# Patient Record
Sex: FEMALE | Race: WHITE | NOT HISPANIC OR LATINO | URBAN - METROPOLITAN AREA
[De-identification: names, ages, dates, MRNs, and addresses within clinical notes are randomized per-mention and may not be internally consistent; named-entity substitution may affect disease eponyms.]

---

## 2019-03-27 PROBLEM — Z00.00 ENCOUNTER FOR PREVENTIVE HEALTH EXAMINATION: Status: ACTIVE | Noted: 2019-03-27

## 2019-04-01 ENCOUNTER — OUTPATIENT (OUTPATIENT)
Dept: OUTPATIENT SERVICES | Facility: HOSPITAL | Age: 54
LOS: 1 days | End: 2019-04-01
Payer: COMMERCIAL

## 2019-04-01 ENCOUNTER — APPOINTMENT (OUTPATIENT)
Dept: ULTRASOUND IMAGING | Facility: CLINIC | Age: 54
End: 2019-04-01

## 2019-04-01 ENCOUNTER — RESULT REVIEW (OUTPATIENT)
Age: 54
End: 2019-04-01

## 2019-04-01 DIAGNOSIS — Z00.8 ENCOUNTER FOR OTHER GENERAL EXAMINATION: ICD-10-CM

## 2019-04-01 PROCEDURE — 10005 FNA BX W/US GDN 1ST LES: CPT

## 2019-04-01 PROCEDURE — 88173 CYTOPATH EVAL FNA REPORT: CPT | Mod: 26

## 2019-04-01 PROCEDURE — 88172 CYTP DX EVAL FNA 1ST EA SITE: CPT

## 2019-04-01 PROCEDURE — 88173 CYTOPATH EVAL FNA REPORT: CPT

## 2019-08-06 ENCOUNTER — APPOINTMENT (OUTPATIENT)
Dept: MRI IMAGING | Facility: CLINIC | Age: 54
End: 2019-08-06
Payer: COMMERCIAL

## 2019-08-06 ENCOUNTER — OUTPATIENT (OUTPATIENT)
Dept: OUTPATIENT SERVICES | Facility: HOSPITAL | Age: 54
LOS: 1 days | End: 2019-08-06
Payer: COMMERCIAL

## 2019-08-06 DIAGNOSIS — M54.12 RADICULOPATHY, CERVICAL REGION: ICD-10-CM

## 2019-08-06 PROCEDURE — 72141 MRI NECK SPINE W/O DYE: CPT

## 2019-08-06 PROCEDURE — 72141 MRI NECK SPINE W/O DYE: CPT | Mod: 26

## 2019-08-13 ENCOUNTER — OUTPATIENT (OUTPATIENT)
Dept: OUTPATIENT SERVICES | Facility: HOSPITAL | Age: 54
LOS: 1 days | Discharge: HOME | End: 2019-08-13
Payer: COMMERCIAL

## 2019-08-13 DIAGNOSIS — M54.12 RADICULOPATHY, CERVICAL REGION: ICD-10-CM

## 2019-08-13 PROCEDURE — 62321 NJX INTERLAMINAR CRV/THRC: CPT

## 2019-08-13 PROCEDURE — 77003 FLUOROGUIDE FOR SPINE INJECT: CPT

## 2019-09-05 ENCOUNTER — OUTPATIENT (OUTPATIENT)
Dept: OUTPATIENT SERVICES | Facility: HOSPITAL | Age: 54
LOS: 1 days | End: 2019-09-05
Payer: COMMERCIAL

## 2019-09-05 DIAGNOSIS — M54.12 RADICULOPATHY, CERVICAL REGION: ICD-10-CM

## 2019-09-05 PROCEDURE — 77003 FLUOROGUIDE FOR SPINE INJECT: CPT

## 2019-09-05 PROCEDURE — 62321 NJX INTERLAMINAR CRV/THRC: CPT

## 2019-09-17 ENCOUNTER — OUTPATIENT (OUTPATIENT)
Dept: OUTPATIENT SERVICES | Facility: HOSPITAL | Age: 54
LOS: 1 days | End: 2019-09-17
Payer: COMMERCIAL

## 2019-09-17 DIAGNOSIS — M54.12 RADICULOPATHY, CERVICAL REGION: ICD-10-CM

## 2019-09-17 PROCEDURE — 62321 NJX INTERLAMINAR CRV/THRC: CPT

## 2019-09-17 PROCEDURE — 77003 FLUOROGUIDE FOR SPINE INJECT: CPT

## 2020-04-25 ENCOUNTER — MESSAGE (OUTPATIENT)
Age: 55
End: 2020-04-25

## 2020-05-02 LAB
SARS-COV-2 IGG SERPL IA-ACNC: 5.8 INDEX
SARS-COV-2 IGG SERPL QL IA: POSITIVE

## 2020-09-25 ENCOUNTER — OUTPATIENT (OUTPATIENT)
Dept: OUTPATIENT SERVICES | Facility: HOSPITAL | Age: 55
LOS: 1 days | End: 2020-09-25
Payer: COMMERCIAL

## 2020-09-25 DIAGNOSIS — H25.12 AGE-RELATED NUCLEAR CATARACT, LEFT EYE: ICD-10-CM

## 2020-09-25 PROCEDURE — 93010 ELECTROCARDIOGRAM REPORT: CPT

## 2020-09-25 PROCEDURE — 93005 ELECTROCARDIOGRAM TRACING: CPT

## 2020-10-12 ENCOUNTER — APPOINTMENT (OUTPATIENT)
Dept: INTERNAL MEDICINE | Facility: CLINIC | Age: 55
End: 2020-10-12
Payer: COMMERCIAL

## 2020-10-12 VITALS — SYSTOLIC BLOOD PRESSURE: 140 MMHG | DIASTOLIC BLOOD PRESSURE: 84 MMHG

## 2020-10-12 VITALS
TEMPERATURE: 98.2 F | BODY MASS INDEX: 22.15 KG/M2 | HEART RATE: 97 BPM | WEIGHT: 125 LBS | HEIGHT: 63 IN | OXYGEN SATURATION: 99 %

## 2020-10-12 DIAGNOSIS — Z23 ENCOUNTER FOR IMMUNIZATION: ICD-10-CM

## 2020-10-12 PROCEDURE — 99204 OFFICE O/P NEW MOD 45 MIN: CPT

## 2020-10-12 NOTE — PHYSICAL EXAM
[No Acute Distress] : no acute distress [Normal Sclera/Conjunctiva] : normal sclera/conjunctiva [Normal Outer Ear/Nose] : the outer ears and nose were normal in appearance [No JVD] : no jugular venous distention [No Lymphadenopathy] : no lymphadenopathy [No Respiratory Distress] : no respiratory distress  [No Accessory Muscle Use] : no accessory muscle use [Normal Rate] : normal rate  [Regular Rhythm] : with a regular rhythm [No Edema] : there was no peripheral edema [No Extremity Clubbing/Cyanosis] : no extremity clubbing/cyanosis [Soft] : abdomen soft [Non Tender] : non-tender

## 2021-01-01 ENCOUNTER — OUTPATIENT (OUTPATIENT)
Dept: OUTPATIENT SERVICES | Facility: HOSPITAL | Age: 56
LOS: 1 days | End: 2021-01-01
Payer: COMMERCIAL

## 2021-01-01 ENCOUNTER — LABORATORY RESULT (OUTPATIENT)
Age: 56
End: 2021-01-01

## 2021-01-01 ENCOUNTER — NON-APPOINTMENT (OUTPATIENT)
Age: 56
End: 2021-01-01

## 2021-01-01 ENCOUNTER — APPOINTMENT (OUTPATIENT)
Dept: HEPATOLOGY | Facility: CLINIC | Age: 56
End: 2021-01-01

## 2021-01-01 ENCOUNTER — APPOINTMENT (OUTPATIENT)
Dept: DISASTER EMERGENCY | Facility: CLINIC | Age: 56
End: 2021-01-01

## 2021-01-01 ENCOUNTER — APPOINTMENT (OUTPATIENT)
Dept: INTERNAL MEDICINE | Facility: CLINIC | Age: 56
End: 2021-01-01
Payer: COMMERCIAL

## 2021-01-01 ENCOUNTER — OUTPATIENT (OUTPATIENT)
Dept: OUTPATIENT SERVICES | Facility: HOSPITAL | Age: 56
LOS: 1 days | End: 2021-01-01

## 2021-01-01 ENCOUNTER — APPOINTMENT (OUTPATIENT)
Dept: INTERVENTIONAL RADIOLOGY/VASCULAR | Facility: CLINIC | Age: 56
End: 2021-01-01

## 2021-01-01 ENCOUNTER — APPOINTMENT (OUTPATIENT)
Dept: HEPATOLOGY | Facility: CLINIC | Age: 56
End: 2021-01-01
Payer: COMMERCIAL

## 2021-01-01 ENCOUNTER — APPOINTMENT (OUTPATIENT)
Dept: HEPATOLOGY | Facility: HOSPITAL | Age: 56
End: 2021-01-01

## 2021-01-01 ENCOUNTER — RESULT REVIEW (OUTPATIENT)
Age: 56
End: 2021-01-01

## 2021-01-01 ENCOUNTER — APPOINTMENT (OUTPATIENT)
Dept: ULTRASOUND IMAGING | Facility: HOSPITAL | Age: 56
End: 2021-01-01

## 2021-01-01 ENCOUNTER — INPATIENT (INPATIENT)
Facility: HOSPITAL | Age: 56
LOS: 2 days | DRG: 871 | End: 2021-11-30
Attending: STUDENT IN AN ORGANIZED HEALTH CARE EDUCATION/TRAINING PROGRAM | Admitting: STUDENT IN AN ORGANIZED HEALTH CARE EDUCATION/TRAINING PROGRAM
Payer: COMMERCIAL

## 2021-01-01 ENCOUNTER — OUTPATIENT (OUTPATIENT)
Dept: OUTPATIENT SERVICES | Facility: HOSPITAL | Age: 56
LOS: 1 days | Discharge: HOME | End: 2021-01-01
Payer: COMMERCIAL

## 2021-01-01 ENCOUNTER — EMERGENCY (EMERGENCY)
Facility: HOSPITAL | Age: 56
LOS: 1 days | Discharge: HOME | End: 2021-01-01
Attending: EMERGENCY MEDICINE | Admitting: EMERGENCY MEDICINE
Payer: COMMERCIAL

## 2021-01-01 ENCOUNTER — APPOINTMENT (OUTPATIENT)
Dept: ULTRASOUND IMAGING | Facility: CLINIC | Age: 56
End: 2021-01-01
Payer: COMMERCIAL

## 2021-01-01 VITALS
HEIGHT: 63 IN | TEMPERATURE: 98 F | SYSTOLIC BLOOD PRESSURE: 74 MMHG | OXYGEN SATURATION: 99 % | RESPIRATION RATE: 22 BRPM | DIASTOLIC BLOOD PRESSURE: 47 MMHG | WEIGHT: 130.07 LBS | HEART RATE: 105 BPM

## 2021-01-01 VITALS
RESPIRATION RATE: 19 BRPM | OXYGEN SATURATION: 97 % | DIASTOLIC BLOOD PRESSURE: 60 MMHG | HEART RATE: 82 BPM | SYSTOLIC BLOOD PRESSURE: 120 MMHG

## 2021-01-01 VITALS
HEART RATE: 95 BPM | OXYGEN SATURATION: 100 % | RESPIRATION RATE: 18 BRPM | HEIGHT: 63 IN | WEIGHT: 121.92 LBS | DIASTOLIC BLOOD PRESSURE: 74 MMHG | SYSTOLIC BLOOD PRESSURE: 100 MMHG | TEMPERATURE: 98 F

## 2021-01-01 VITALS
OXYGEN SATURATION: 96 % | HEART RATE: 75 BPM | WEIGHT: 120 LBS | DIASTOLIC BLOOD PRESSURE: 80 MMHG | BODY MASS INDEX: 21.26 KG/M2 | SYSTOLIC BLOOD PRESSURE: 116 MMHG | TEMPERATURE: 98 F

## 2021-01-01 VITALS
HEIGHT: 63 IN | DIASTOLIC BLOOD PRESSURE: 91 MMHG | RESPIRATION RATE: 16 BRPM | SYSTOLIC BLOOD PRESSURE: 142 MMHG | HEART RATE: 75 BPM | OXYGEN SATURATION: 96 % | WEIGHT: 124 LBS | BODY MASS INDEX: 21.97 KG/M2 | TEMPERATURE: 97.8 F

## 2021-01-01 VITALS
RESPIRATION RATE: 20 BRPM | HEIGHT: 63 IN | DIASTOLIC BLOOD PRESSURE: 75 MMHG | HEART RATE: 73 BPM | WEIGHT: 125 LBS | SYSTOLIC BLOOD PRESSURE: 119 MMHG | OXYGEN SATURATION: 99 %

## 2021-01-01 VITALS
DIASTOLIC BLOOD PRESSURE: 81 MMHG | RESPIRATION RATE: 16 BRPM | OXYGEN SATURATION: 97 % | TEMPERATURE: 98.7 F | WEIGHT: 134 LBS | BODY MASS INDEX: 23.74 KG/M2 | HEART RATE: 92 BPM | HEIGHT: 63 IN | SYSTOLIC BLOOD PRESSURE: 121 MMHG

## 2021-01-01 VITALS — SYSTOLIC BLOOD PRESSURE: 110 MMHG | DIASTOLIC BLOOD PRESSURE: 78 MMHG

## 2021-01-01 VITALS
HEART RATE: 107 BPM | OXYGEN SATURATION: 99 % | BODY MASS INDEX: 23.74 KG/M2 | WEIGHT: 134 LBS | HEIGHT: 63 IN | TEMPERATURE: 98 F

## 2021-01-01 VITALS
RESPIRATION RATE: 17 BRPM | TEMPERATURE: 98 F | OXYGEN SATURATION: 100 % | DIASTOLIC BLOOD PRESSURE: 70 MMHG | SYSTOLIC BLOOD PRESSURE: 104 MMHG

## 2021-01-01 VITALS
BODY MASS INDEX: 21.19 KG/M2 | HEIGHT: 63 IN | WEIGHT: 119.6 LBS | HEART RATE: 71 BPM | OXYGEN SATURATION: 98 % | DIASTOLIC BLOOD PRESSURE: 86 MMHG | RESPIRATION RATE: 16 BRPM | TEMPERATURE: 98.3 F | SYSTOLIC BLOOD PRESSURE: 124 MMHG

## 2021-01-01 VITALS
RESPIRATION RATE: 14 BRPM | WEIGHT: 136 LBS | HEIGHT: 63 IN | BODY MASS INDEX: 24.1 KG/M2 | TEMPERATURE: 98.1 F | HEART RATE: 93 BPM | OXYGEN SATURATION: 97 %

## 2021-01-01 VITALS — SYSTOLIC BLOOD PRESSURE: 144 MMHG | DIASTOLIC BLOOD PRESSURE: 84 MMHG

## 2021-01-01 VITALS
BODY MASS INDEX: 21.97 KG/M2 | OXYGEN SATURATION: 97 % | WEIGHT: 124 LBS | HEART RATE: 64 BPM | HEIGHT: 63 IN | TEMPERATURE: 97.7 F

## 2021-01-01 VITALS — RESPIRATION RATE: 9 BRPM

## 2021-01-01 DIAGNOSIS — K70.31 ALCOHOLIC CIRRHOSIS OF LIVER WITH ASCITES: ICD-10-CM

## 2021-01-01 DIAGNOSIS — E46 UNSPECIFIED PROTEIN-CALORIE MALNUTRITION: ICD-10-CM

## 2021-01-01 DIAGNOSIS — R74.8 ABNORMAL LEVELS OF OTHER SERUM ENZYMES: ICD-10-CM

## 2021-01-01 DIAGNOSIS — I85.00 ESOPHAGEAL VARICES W/OUT BLEEDING: ICD-10-CM

## 2021-01-01 DIAGNOSIS — Z87.19 PERSONAL HISTORY OF OTHER DISEASES OF THE DIGESTIVE SYSTEM: ICD-10-CM

## 2021-01-01 DIAGNOSIS — A41.9 SEPSIS, UNSPECIFIED ORGANISM: ICD-10-CM

## 2021-01-01 DIAGNOSIS — R06.89 OTHER ABNORMALITIES OF BREATHING: ICD-10-CM

## 2021-01-01 DIAGNOSIS — Z78.9 OTHER SPECIFIED HEALTH STATUS: ICD-10-CM

## 2021-01-01 DIAGNOSIS — D12.6 BENIGN NEOPLASM OF COLON, UNSPECIFIED: ICD-10-CM

## 2021-01-01 DIAGNOSIS — Z86.39 PERSONAL HISTORY OF OTHER ENDOCRINE, NUTRITIONAL AND METABOLIC DISEASE: ICD-10-CM

## 2021-01-01 DIAGNOSIS — R14.0 ABDOMINAL DISTENSION (GASEOUS): ICD-10-CM

## 2021-01-01 DIAGNOSIS — D64.9 ANEMIA, UNSPECIFIED: ICD-10-CM

## 2021-01-01 DIAGNOSIS — E87.2 ACIDOSIS: ICD-10-CM

## 2021-01-01 DIAGNOSIS — R65.10 SYSTEMIC INFLAMMATORY RESPONSE SYNDROME (SIRS) OF NON-INFECTIOUS ORIGIN WITHOUT ACUTE ORGAN DYSFUNCTION: ICD-10-CM

## 2021-01-01 DIAGNOSIS — E88.01 ALPHA-1-ANTITRYPSIN DEFICIENCY: ICD-10-CM

## 2021-01-01 DIAGNOSIS — N17.9 ACUTE KIDNEY FAILURE, UNSPECIFIED: ICD-10-CM

## 2021-01-01 DIAGNOSIS — I10 ESSENTIAL (PRIMARY) HYPERTENSION: ICD-10-CM

## 2021-01-01 DIAGNOSIS — K74.60 UNSPECIFIED CIRRHOSIS OF LIVER: ICD-10-CM

## 2021-01-01 DIAGNOSIS — K72.90 HEPATIC FAILURE, UNSPECIFIED WITHOUT COMA: ICD-10-CM

## 2021-01-01 DIAGNOSIS — K31.89 OTHER DISEASES OF STOMACH AND DUODENUM: ICD-10-CM

## 2021-01-01 DIAGNOSIS — Z29.9 ENCOUNTER FOR PROPHYLACTIC MEASURES, UNSPECIFIED: ICD-10-CM

## 2021-01-01 DIAGNOSIS — R19.4 CHANGE IN BOWEL HABIT: ICD-10-CM

## 2021-01-01 DIAGNOSIS — M62.50 MUSCLE WASTING AND ATROPHY, NOT ELSEWHERE CLASSIFIED, UNSPECIFIED SITE: ICD-10-CM

## 2021-01-01 DIAGNOSIS — Z98.890 OTHER SPECIFIED POSTPROCEDURAL STATES: Chronic | ICD-10-CM

## 2021-01-01 DIAGNOSIS — R60.0 LOCALIZED EDEMA: ICD-10-CM

## 2021-01-01 DIAGNOSIS — F10.10 ALCOHOL ABUSE, UNCOMPLICATED: ICD-10-CM

## 2021-01-01 LAB
A1AT PHENOTYP SERPL-IMP: NORMAL
A1AT SERPL-MCNC: 117 MG/DL
AFP-TM SERPL-MCNC: 5.2 NG/ML
ALBUMIN FLD-MCNC: <0.2 G/DL — SIGNIFICANT CHANGE UP
ALBUMIN SERPL ELPH-MCNC: 1.7 G/DL — LOW (ref 3.3–5)
ALBUMIN SERPL ELPH-MCNC: 1.9 G/DL — LOW (ref 3.3–5)
ALBUMIN SERPL ELPH-MCNC: 2 G/DL — LOW (ref 3.3–5)
ALBUMIN SERPL ELPH-MCNC: 3.1 G/DL — LOW (ref 3.3–5)
ALBUMIN SERPL ELPH-MCNC: 3.1 G/DL — LOW (ref 3.3–5)
ALBUMIN SERPL ELPH-MCNC: 3.3 G/DL
ALBUMIN SERPL ELPH-MCNC: 3.4 G/DL
ALP BLD-CCNC: 128 U/L
ALP BLD-CCNC: 136 U/L
ALP SERPL-CCNC: 122 U/L — HIGH (ref 40–120)
ALP SERPL-CCNC: 141 U/L — HIGH (ref 40–120)
ALP SERPL-CCNC: 156 U/L — HIGH (ref 40–120)
ALP SERPL-CCNC: 76 U/L — SIGNIFICANT CHANGE UP (ref 40–120)
ALP SERPL-CCNC: 92 U/L — SIGNIFICANT CHANGE UP (ref 40–120)
ALPHA-1-FETOPROTEIN-L3: SIGNIFICANT CHANGE UP % (ref 0–9.9)
ALPHA-1-FETOPROTEIN: 4 NG/ML — SIGNIFICANT CHANGE UP (ref 0–8)
ALT FLD-CCNC: 34 U/L — SIGNIFICANT CHANGE UP (ref 12–78)
ALT FLD-CCNC: 37 U/L — SIGNIFICANT CHANGE UP (ref 12–78)
ALT FLD-CCNC: 40 U/L — SIGNIFICANT CHANGE UP (ref 12–78)
ALT FLD-CCNC: 43 U/L — SIGNIFICANT CHANGE UP (ref 12–78)
ALT FLD-CCNC: 45 U/L — SIGNIFICANT CHANGE UP (ref 12–78)
ALT SERPL-CCNC: 26 U/L
ALT SERPL-CCNC: 27 U/L
AMMONIA BLD-MCNC: 107 UMOL/L — HIGH (ref 11–32)
AMMONIA BLD-MCNC: 118 UMOL/L — HIGH (ref 11–32)
AMMONIA BLD-MCNC: 175 UMOL/L — HIGH (ref 11–32)
AMMONIA BLD-MCNC: 42 UMOL/L — HIGH (ref 11–32)
AMMONIA BLD-MCNC: 84 UMOL/L — HIGH (ref 11–32)
AMYLASE/CREAT SERPL: 57 U/L
ANA PAT FLD IF-IMP: ABNORMAL
ANA PATTERN: ABNORMAL
ANA SER IF-ACNC: ABNORMAL
ANA TITER: ABNORMAL
ANION GAP SERPL CALC-SCNC: 10 MMOL/L — SIGNIFICANT CHANGE UP (ref 5–17)
ANION GAP SERPL CALC-SCNC: 13 MMOL/L
ANION GAP SERPL CALC-SCNC: 14 MMOL/L
ANION GAP SERPL CALC-SCNC: 14 MMOL/L — SIGNIFICANT CHANGE UP (ref 5–17)
ANION GAP SERPL CALC-SCNC: 15 MMOL/L — SIGNIFICANT CHANGE UP (ref 5–17)
ANION GAP SERPL CALC-SCNC: 15 MMOL/L — SIGNIFICANT CHANGE UP (ref 5–17)
ANION GAP SERPL CALC-SCNC: 17 MMOL/L — SIGNIFICANT CHANGE UP (ref 5–17)
APPEARANCE UR: ABNORMAL
APTT BLD: 36.6 SEC — HIGH (ref 27.5–35.5)
APTT BLD: 38.9 SEC — HIGH (ref 27.5–35.5)
APTT BLD: 72.6 SEC — HIGH (ref 27.5–35.5)
AST SERPL-CCNC: 102 U/L — HIGH (ref 15–37)
AST SERPL-CCNC: 108 U/L
AST SERPL-CCNC: 83 U/L — HIGH (ref 15–37)
AST SERPL-CCNC: 84 U/L — HIGH (ref 15–37)
AST SERPL-CCNC: 86 U/L — HIGH (ref 15–37)
AST SERPL-CCNC: 93 U/L — HIGH (ref 15–37)
AST SERPL-CCNC: 95 U/L
B PERT IGG+IGM PNL SER: CLEAR — SIGNIFICANT CHANGE UP
BACTERIA # UR AUTO: ABNORMAL
BASE EXCESS BLDA CALC-SCNC: -14.1 MMOL/L — LOW (ref -2–3)
BASE EXCESS BLDA CALC-SCNC: -15.5 MMOL/L — LOW (ref -2–3)
BASE EXCESS BLDA CALC-SCNC: -17.1 MMOL/L — LOW (ref -2–3)
BASOPHILS # BLD AUTO: 0.02 K/UL — SIGNIFICANT CHANGE UP (ref 0–0.2)
BASOPHILS # BLD AUTO: 0.02 K/UL — SIGNIFICANT CHANGE UP (ref 0–0.2)
BASOPHILS # BLD AUTO: 0.03 K/UL — SIGNIFICANT CHANGE UP (ref 0–0.2)
BASOPHILS # BLD AUTO: 0.04 K/UL — SIGNIFICANT CHANGE UP (ref 0–0.2)
BASOPHILS # BLD AUTO: 0.06 K/UL
BASOPHILS # BLD AUTO: 0.09 K/UL
BASOPHILS NFR BLD AUTO: 0.2 % — SIGNIFICANT CHANGE UP (ref 0–2)
BASOPHILS NFR BLD AUTO: 0.2 % — SIGNIFICANT CHANGE UP (ref 0–2)
BASOPHILS NFR BLD AUTO: 0.3 % — SIGNIFICANT CHANGE UP (ref 0–2)
BASOPHILS NFR BLD AUTO: 0.8 % — SIGNIFICANT CHANGE UP (ref 0–2)
BASOPHILS NFR BLD AUTO: 1.1 %
BASOPHILS NFR BLD AUTO: 1.6 %
BILIRUB DIRECT SERPL-MCNC: 0.7 MG/DL — HIGH (ref 0–0.3)
BILIRUB SERPL-MCNC: 1.3 MG/DL — HIGH (ref 0.2–1.2)
BILIRUB SERPL-MCNC: 1.4 MG/DL — HIGH (ref 0.2–1.2)
BILIRUB SERPL-MCNC: 1.5 MG/DL — HIGH (ref 0.2–1.2)
BILIRUB SERPL-MCNC: 1.7 MG/DL — HIGH (ref 0.2–1.2)
BILIRUB SERPL-MCNC: 1.8 MG/DL — HIGH (ref 0.2–1.2)
BILIRUB SERPL-MCNC: 2.3 MG/DL
BILIRUB SERPL-MCNC: 2.4 MG/DL
BILIRUB UR-MCNC: NEGATIVE — SIGNIFICANT CHANGE UP
BLOOD GAS COMMENTS ARTERIAL: SIGNIFICANT CHANGE UP
BUN SERPL-MCNC: 4 MG/DL
BUN SERPL-MCNC: 4 MG/DL
BUN SERPL-MCNC: 52 MG/DL — HIGH (ref 7–23)
BUN SERPL-MCNC: 83 MG/DL — HIGH (ref 7–23)
BUN SERPL-MCNC: 84 MG/DL — HIGH (ref 7–23)
BUN SERPL-MCNC: 88 MG/DL — HIGH (ref 7–23)
BUN SERPL-MCNC: 90 MG/DL — HIGH (ref 7–23)
CALCIUM SERPL-MCNC: 8.9 MG/DL
CALCIUM SERPL-MCNC: 8.9 MG/DL — SIGNIFICANT CHANGE UP (ref 8.5–10.1)
CALCIUM SERPL-MCNC: 9 MG/DL — SIGNIFICANT CHANGE UP (ref 8.5–10.1)
CALCIUM SERPL-MCNC: 9 MG/DL — SIGNIFICANT CHANGE UP (ref 8.5–10.1)
CALCIUM SERPL-MCNC: 9.1 MG/DL
CALCIUM SERPL-MCNC: 9.1 MG/DL — SIGNIFICANT CHANGE UP (ref 8.5–10.1)
CALCIUM SERPL-MCNC: 9.7 MG/DL — SIGNIFICANT CHANGE UP (ref 8.5–10.1)
CERULOPLASMIN SERPL-MCNC: 25 MG/DL
CHLORIDE SERPL-SCNC: 100 MMOL/L
CHLORIDE SERPL-SCNC: 101 MMOL/L — SIGNIFICANT CHANGE UP (ref 96–108)
CHLORIDE SERPL-SCNC: 102 MMOL/L — SIGNIFICANT CHANGE UP (ref 96–108)
CHLORIDE SERPL-SCNC: 103 MMOL/L — SIGNIFICANT CHANGE UP (ref 96–108)
CHLORIDE SERPL-SCNC: 104 MMOL/L — SIGNIFICANT CHANGE UP (ref 96–108)
CHLORIDE SERPL-SCNC: 107 MMOL/L — SIGNIFICANT CHANGE UP (ref 96–108)
CHLORIDE SERPL-SCNC: 99 MMOL/L
CHOLEST SERPL-MCNC: 143 MG/DL
CK MB BLD-MCNC: 11.9 % — HIGH (ref 0–3.5)
CK MB CFR SERPL CALC: 10 NG/ML — HIGH (ref 0–3.6)
CK SERPL-CCNC: 84 U/L — SIGNIFICANT CHANGE UP (ref 26–192)
CO2 SERPL-SCNC: 13 MMOL/L — LOW (ref 22–31)
CO2 SERPL-SCNC: 14 MMOL/L — LOW (ref 22–31)
CO2 SERPL-SCNC: 20 MMOL/L — LOW (ref 22–31)
CO2 SERPL-SCNC: 24 MMOL/L
CO2 SERPL-SCNC: 24 MMOL/L
COLOR FLD: YELLOW — SIGNIFICANT CHANGE UP
COLOR SPEC: YELLOW — SIGNIFICANT CHANGE UP
COMMENT - URINE: SIGNIFICANT CHANGE UP
CORTIS AM PEAK SERPL-MCNC: 27.1 UG/DL — HIGH (ref 6–18.4)
COVID-19 NUCLEOCAPSID GAM AB INTERP: POSITIVE
COVID-19 NUCLEOCAPSID TOTAL GAM ANTIBODY RESULT: 53.6 INDEX — HIGH
COVID-19 SPIKE DOMAIN AB INTERP: POSITIVE
COVID-19 SPIKE DOMAIN ANTIBODY RESULT: >250 U/ML — HIGH
CREAT ?TM UR-MCNC: 87 MG/DL — SIGNIFICANT CHANGE UP
CREAT SERPL-MCNC: 0.48 MG/DL
CREAT SERPL-MCNC: 0.51 MG/DL
CREAT SERPL-MCNC: 1.5 MG/DL — HIGH (ref 0.5–1.3)
CREAT SERPL-MCNC: 4 MG/DL — HIGH (ref 0.5–1.3)
CREAT SERPL-MCNC: 4 MG/DL — HIGH (ref 0.5–1.3)
CREAT SERPL-MCNC: 4.1 MG/DL — HIGH (ref 0.5–1.3)
CREAT SERPL-MCNC: 4.3 MG/DL — HIGH (ref 0.5–1.3)
CULTURE RESULTS: NO GROWTH — SIGNIFICANT CHANGE UP
DEPRECATED KAPPA LC FREE/LAMBDA SER: 0.77 RATIO
DIFF PNL FLD: ABNORMAL
EOSINOPHIL # BLD AUTO: 0 K/UL — SIGNIFICANT CHANGE UP (ref 0–0.5)
EOSINOPHIL # BLD AUTO: 0.01 K/UL — SIGNIFICANT CHANGE UP (ref 0–0.5)
EOSINOPHIL # BLD AUTO: 0.02 K/UL — SIGNIFICANT CHANGE UP (ref 0–0.5)
EOSINOPHIL # BLD AUTO: 0.03 K/UL
EOSINOPHIL # BLD AUTO: 0.04 K/UL — SIGNIFICANT CHANGE UP (ref 0–0.5)
EOSINOPHIL # BLD AUTO: 0.07 K/UL
EOSINOPHIL NFR BLD AUTO: 0 % — SIGNIFICANT CHANGE UP (ref 0–6)
EOSINOPHIL NFR BLD AUTO: 0.1 % — SIGNIFICANT CHANGE UP (ref 0–6)
EOSINOPHIL NFR BLD AUTO: 0.2 % — SIGNIFICANT CHANGE UP (ref 0–6)
EOSINOPHIL NFR BLD AUTO: 0.5 %
EOSINOPHIL NFR BLD AUTO: 0.8 % — SIGNIFICANT CHANGE UP (ref 0–6)
EOSINOPHIL NFR BLD AUTO: 1.3 %
EPI CELLS # UR: SIGNIFICANT CHANGE UP
ETHANOL BLD-MCNC: <10 MG/DL
ETHANOL SERPL-MCNC: <10 MG/DL — SIGNIFICANT CHANGE UP (ref 0–10)
ETHANOL SERPL-MCNC: <10 MG/DL — SIGNIFICANT CHANGE UP (ref 0–10)
FERRITIN SERPL-MCNC: 116 NG/ML
FLUID INTAKE SUBSTANCE CLASS: SIGNIFICANT CHANGE UP
FLUID SEGMENTED GRANULOCYTES: 19 % — SIGNIFICANT CHANGE UP
FOLATE SERPL-MCNC: 9.6 NG/ML
GAS PNL BLDA: SIGNIFICANT CHANGE UP
GAS PNL BLDA: SIGNIFICANT CHANGE UP
GGT SERPL-CCNC: 237 U/L
GLUCOSE FLD-MCNC: 93 MG/DL — SIGNIFICANT CHANGE UP
GLUCOSE SERPL-MCNC: 101 MG/DL — HIGH (ref 70–99)
GLUCOSE SERPL-MCNC: 102 MG/DL — HIGH (ref 70–99)
GLUCOSE SERPL-MCNC: 105 MG/DL — HIGH (ref 70–99)
GLUCOSE SERPL-MCNC: 133 MG/DL
GLUCOSE SERPL-MCNC: 77 MG/DL — SIGNIFICANT CHANGE UP (ref 70–99)
GLUCOSE SERPL-MCNC: 93 MG/DL
GLUCOSE SERPL-MCNC: 93 MG/DL — SIGNIFICANT CHANGE UP (ref 70–99)
GLUCOSE UR QL: NEGATIVE — SIGNIFICANT CHANGE UP
GRAM STN FLD: SIGNIFICANT CHANGE UP
HAV IGM SER QL: NONREACTIVE
HBV CORE IGG+IGM SER QL: NONREACTIVE
HBV SURFACE AB SER QL: REACTIVE
HBV SURFACE AB SERPL IA-ACNC: 87.8 MIU/ML
HBV SURFACE AG SER QL: NONREACTIVE
HCO3 BLDA-SCNC: 11 MMOL/L — LOW (ref 21–28)
HCO3 BLDA-SCNC: 11 MMOL/L — LOW (ref 21–28)
HCO3 BLDA-SCNC: 12 MMOL/L — LOW (ref 21–28)
HCT VFR BLD CALC: 22.7 % — LOW (ref 34.5–45)
HCT VFR BLD CALC: 24.3 % — LOW (ref 34.5–45)
HCT VFR BLD CALC: 28.4 % — LOW (ref 34.5–45)
HCT VFR BLD CALC: 28.8 % — LOW (ref 34.5–45)
HCT VFR BLD CALC: 29.7 % — LOW (ref 34.5–45)
HCT VFR BLD CALC: 38.9 %
HCT VFR BLD CALC: 43.4 %
HCV AB S/CO SERPL IA: 0.18 S/CO — SIGNIFICANT CHANGE UP (ref 0–0.99)
HCV AB SER QL: NONREACTIVE
HCV AB SERPL-IMP: SIGNIFICANT CHANGE UP
HCV S/CO RATIO: 0.13 S/CO
HDLC SERPL-MCNC: 19 MG/DL
HEPATITIS A IGG ANTIBODY: NONREACTIVE
HGB BLD-MCNC: 10.3 G/DL — LOW (ref 11.5–15.5)
HGB BLD-MCNC: 10.7 G/DL — LOW (ref 11.5–15.5)
HGB BLD-MCNC: 13 G/DL
HGB BLD-MCNC: 13.5 G/DL
HGB BLD-MCNC: 8 G/DL — LOW (ref 11.5–15.5)
HGB BLD-MCNC: 8.5 G/DL — LOW (ref 11.5–15.5)
HGB BLD-MCNC: 9.9 G/DL — LOW (ref 11.5–15.5)
HOROWITZ INDEX BLDA+IHG-RTO: 100 — SIGNIFICANT CHANGE UP
HOROWITZ INDEX BLDA+IHG-RTO: 21 — SIGNIFICANT CHANGE UP
HOROWITZ INDEX BLDA+IHG-RTO: 30 — SIGNIFICANT CHANGE UP
HYALINE CASTS # UR AUTO: ABNORMAL
IGA SER QL IEP: 811 MG/DL
IGG SER QL IEP: 1331 MG/DL
IGM SER QL IEP: 230 MG/DL
IMM GRANULOCYTES NFR BLD AUTO: 0.2 %
IMM GRANULOCYTES NFR BLD AUTO: 0.2 %
IMM GRANULOCYTES NFR BLD AUTO: 0.6 % — SIGNIFICANT CHANGE UP (ref 0–1.5)
IMM GRANULOCYTES NFR BLD AUTO: 0.6 % — SIGNIFICANT CHANGE UP (ref 0–1.5)
IMM GRANULOCYTES NFR BLD AUTO: 0.7 % — SIGNIFICANT CHANGE UP (ref 0–1.5)
IMM GRANULOCYTES NFR BLD AUTO: 0.7 % — SIGNIFICANT CHANGE UP (ref 0–1.5)
INR BLD: 1.47 RATIO — HIGH (ref 0.88–1.16)
INR BLD: 1.54 RATIO — HIGH (ref 0.88–1.16)
INR BLD: 2.06 RATIO — HIGH (ref 0.88–1.16)
INR BLD: 2.55 RATIO — HIGH (ref 0.88–1.16)
IRON SATN MFR SERPL: 43 %
IRON SERPL-MCNC: 86 UG/DL
KAPPA LC CSF-MCNC: 3.33 MG/DL
KAPPA LC SERPL-MCNC: 2.55 MG/DL
KETONES UR-MCNC: ABNORMAL
LACTATE SERPL-SCNC: 2.2 MMOL/L — HIGH (ref 0.7–2)
LACTATE SERPL-SCNC: 2.3 MMOL/L — HIGH (ref 0.7–2)
LACTATE SERPL-SCNC: 2.5 MMOL/L — HIGH (ref 0.7–2)
LACTATE SERPL-SCNC: 3 MMOL/L — HIGH (ref 0.7–2)
LDH SERPL L TO P-CCNC: 24 U/L — SIGNIFICANT CHANGE UP
LDLC SERPL CALC-MCNC: 99 MG/DL
LEUKOCYTE ESTERASE UR-ACNC: ABNORMAL
LIDOCAIN IGE QN: 318 U/L — SIGNIFICANT CHANGE UP (ref 73–393)
LYMPHOCYTES # BLD AUTO: 0.74 K/UL — LOW (ref 1–3.3)
LYMPHOCYTES # BLD AUTO: 0.88 K/UL — LOW (ref 1–3.3)
LYMPHOCYTES # BLD AUTO: 1.03 K/UL
LYMPHOCYTES # BLD AUTO: 1.14 K/UL — SIGNIFICANT CHANGE UP (ref 1–3.3)
LYMPHOCYTES # BLD AUTO: 1.2 K/UL
LYMPHOCYTES # BLD AUTO: 1.3 K/UL — SIGNIFICANT CHANGE UP (ref 1–3.3)
LYMPHOCYTES # BLD AUTO: 12.3 % — LOW (ref 13–44)
LYMPHOCYTES # BLD AUTO: 14 % — SIGNIFICANT CHANGE UP (ref 13–44)
LYMPHOCYTES # BLD AUTO: 14.2 % — SIGNIFICANT CHANGE UP (ref 13–44)
LYMPHOCYTES # BLD AUTO: 7.4 % — LOW (ref 13–44)
LYMPHOCYTES # FLD: 7 % — SIGNIFICANT CHANGE UP
LYMPHOCYTES NFR BLD AUTO: 18.8 %
LYMPHOCYTES NFR BLD AUTO: 21.8 %
MAGNESIUM SERPL-MCNC: 1.6 MG/DL
MAGNESIUM SERPL-MCNC: 2.3 MG/DL — SIGNIFICANT CHANGE UP (ref 1.6–2.6)
MAGNESIUM SERPL-MCNC: 2.4 MG/DL — SIGNIFICANT CHANGE UP (ref 1.6–2.6)
MAGNESIUM SERPL-MCNC: 2.5 MG/DL — SIGNIFICANT CHANGE UP (ref 1.6–2.6)
MAGNESIUM SERPL-MCNC: 2.7 MG/DL — HIGH (ref 1.6–2.6)
MAN DIFF?: NORMAL
MAN DIFF?: NORMAL
MCHC RBC-ENTMCNC: 31 PG — SIGNIFICANT CHANGE UP (ref 27–34)
MCHC RBC-ENTMCNC: 31.1 GM/DL
MCHC RBC-ENTMCNC: 31.1 PG — SIGNIFICANT CHANGE UP (ref 27–34)
MCHC RBC-ENTMCNC: 31.3 PG
MCHC RBC-ENTMCNC: 31.4 PG — SIGNIFICANT CHANGE UP (ref 27–34)
MCHC RBC-ENTMCNC: 31.4 PG — SIGNIFICANT CHANGE UP (ref 27–34)
MCHC RBC-ENTMCNC: 31.8 PG
MCHC RBC-ENTMCNC: 31.8 PG — SIGNIFICANT CHANGE UP (ref 27–34)
MCHC RBC-ENTMCNC: 33.4 GM/DL
MCHC RBC-ENTMCNC: 34.9 GM/DL — SIGNIFICANT CHANGE UP (ref 32–36)
MCHC RBC-ENTMCNC: 35 GM/DL — SIGNIFICANT CHANGE UP (ref 32–36)
MCHC RBC-ENTMCNC: 35.2 GM/DL — SIGNIFICANT CHANGE UP (ref 32–36)
MCHC RBC-ENTMCNC: 35.8 GM/DL — SIGNIFICANT CHANGE UP (ref 32–36)
MCHC RBC-ENTMCNC: 36 GM/DL — SIGNIFICANT CHANGE UP (ref 32–36)
MCV RBC AUTO: 100.7 FL
MCV RBC AUTO: 87.8 FL — SIGNIFICANT CHANGE UP (ref 80–100)
MCV RBC AUTO: 88.3 FL — SIGNIFICANT CHANGE UP (ref 80–100)
MCV RBC AUTO: 88.4 FL — SIGNIFICANT CHANGE UP (ref 80–100)
MCV RBC AUTO: 89 FL — SIGNIFICANT CHANGE UP (ref 80–100)
MCV RBC AUTO: 89.7 FL — SIGNIFICANT CHANGE UP (ref 80–100)
MCV RBC AUTO: 95.1 FL
MELD SCORE WITH DIALYSIS: 32 POINTS — SIGNIFICANT CHANGE UP
MELD SCORE WITHOUT DIALYSIS: 32 POINTS — SIGNIFICANT CHANGE UP
MESOTHL CELL # FLD: 3 % — SIGNIFICANT CHANGE UP
MITOCHONDRIA AB SER IF-ACNC: NORMAL
MONOCYTES # BLD AUTO: 0.69 K/UL — SIGNIFICANT CHANGE UP (ref 0–0.9)
MONOCYTES # BLD AUTO: 0.74 K/UL — SIGNIFICANT CHANGE UP (ref 0–0.9)
MONOCYTES # BLD AUTO: 0.77 K/UL — SIGNIFICANT CHANGE UP (ref 0–0.9)
MONOCYTES # BLD AUTO: 1.04 K/UL — HIGH (ref 0–0.9)
MONOCYTES # BLD AUTO: 1.06 K/UL
MONOCYTES # BLD AUTO: 1.07 K/UL
MONOCYTES NFR BLD AUTO: 13.2 % — SIGNIFICANT CHANGE UP (ref 2–14)
MONOCYTES NFR BLD AUTO: 19.3 %
MONOCYTES NFR BLD AUTO: 19.5 %
MONOCYTES NFR BLD AUTO: 7 % — SIGNIFICANT CHANGE UP (ref 2–14)
MONOCYTES NFR BLD AUTO: 8.7 % — SIGNIFICANT CHANGE UP (ref 2–14)
MONOCYTES NFR BLD AUTO: 9.4 % — SIGNIFICANT CHANGE UP (ref 2–14)
MONOS+MACROS # FLD: 71 % — SIGNIFICANT CHANGE UP
NEUTROPHILS # BLD AUTO: 3.06 K/UL
NEUTROPHILS # BLD AUTO: 3.3 K/UL
NEUTROPHILS # BLD AUTO: 3.68 K/UL — SIGNIFICANT CHANGE UP (ref 1.8–7.4)
NEUTROPHILS # BLD AUTO: 6.16 K/UL — SIGNIFICANT CHANGE UP (ref 1.8–7.4)
NEUTROPHILS # BLD AUTO: 8.4 K/UL — HIGH (ref 1.8–7.4)
NEUTROPHILS # BLD AUTO: 9.93 K/UL — HIGH (ref 1.8–7.4)
NEUTROPHILS NFR BLD AUTO: 55.6 %
NEUTROPHILS NFR BLD AUTO: 60.1 %
NEUTROPHILS NFR BLD AUTO: 70.4 % — SIGNIFICANT CHANGE UP (ref 43–77)
NEUTROPHILS NFR BLD AUTO: 75.5 % — SIGNIFICANT CHANGE UP (ref 43–77)
NEUTROPHILS NFR BLD AUTO: 79.7 % — HIGH (ref 43–77)
NEUTROPHILS NFR BLD AUTO: 83 % — HIGH (ref 43–77)
NIGHT BLUE STAIN TISS: SIGNIFICANT CHANGE UP
NITRITE UR-MCNC: NEGATIVE — SIGNIFICANT CHANGE UP
NONHDLC SERPL-MCNC: 124 MG/DL
NRBC # BLD: 0 /100 WBCS — SIGNIFICANT CHANGE UP (ref 0–0)
OSMOLALITY UR: 314 MOSM/KG — SIGNIFICANT CHANGE UP (ref 50–1200)
PCO2 BLDA: 20 MMHG — LOW (ref 32–35)
PCO2 BLDA: 26 MMHG — LOW (ref 32–35)
PCO2 BLDA: 31 MMHG — LOW (ref 32–35)
PH BLDA: 7.21 — LOW (ref 7.35–7.45)
PH BLDA: 7.22 — LOW (ref 7.35–7.45)
PH BLDA: 7.36 — SIGNIFICANT CHANGE UP (ref 7.35–7.45)
PH FLD: 7.4 — SIGNIFICANT CHANGE UP
PH UR: 5 — SIGNIFICANT CHANGE UP (ref 5–8)
PHOSPHATE SERPL-MCNC: 7.8 MG/DL — HIGH (ref 2.5–4.5)
PHOSPHATE SERPL-MCNC: 7.9 MG/DL — HIGH (ref 2.5–4.5)
PHOSPHATE SERPL-MCNC: 8 MG/DL — HIGH (ref 2.5–4.5)
PHOSPHATIDYETHANOL (PETH), WHOLE BLOOD: 974 NG/ML
PLATELET # BLD AUTO: 140 K/UL — LOW (ref 150–400)
PLATELET # BLD AUTO: 153 K/UL — SIGNIFICANT CHANGE UP (ref 150–400)
PLATELET # BLD AUTO: 155 K/UL
PLATELET # BLD AUTO: 170 K/UL — SIGNIFICANT CHANGE UP (ref 150–400)
PLATELET # BLD AUTO: 183 K/UL
PLATELET # BLD AUTO: 183 K/UL — SIGNIFICANT CHANGE UP (ref 150–400)
PLATELET # BLD AUTO: 190 K/UL — SIGNIFICANT CHANGE UP (ref 150–400)
PO2 BLDA: 107 MMHG — SIGNIFICANT CHANGE UP (ref 83–108)
PO2 BLDA: 114 MMHG — HIGH (ref 83–108)
PO2 BLDA: 99 MMHG — SIGNIFICANT CHANGE UP (ref 83–108)
POTASSIUM SERPL-MCNC: 3 MMOL/L — LOW (ref 3.5–5.3)
POTASSIUM SERPL-MCNC: 4.4 MMOL/L — SIGNIFICANT CHANGE UP (ref 3.5–5.3)
POTASSIUM SERPL-MCNC: 4.4 MMOL/L — SIGNIFICANT CHANGE UP (ref 3.5–5.3)
POTASSIUM SERPL-MCNC: 4.6 MMOL/L — SIGNIFICANT CHANGE UP (ref 3.5–5.3)
POTASSIUM SERPL-MCNC: 4.6 MMOL/L — SIGNIFICANT CHANGE UP (ref 3.5–5.3)
POTASSIUM SERPL-SCNC: 3 MMOL/L
POTASSIUM SERPL-SCNC: 3 MMOL/L — LOW (ref 3.5–5.3)
POTASSIUM SERPL-SCNC: 4 MMOL/L
POTASSIUM SERPL-SCNC: 4.4 MMOL/L — SIGNIFICANT CHANGE UP (ref 3.5–5.3)
POTASSIUM SERPL-SCNC: 4.4 MMOL/L — SIGNIFICANT CHANGE UP (ref 3.5–5.3)
POTASSIUM SERPL-SCNC: 4.6 MMOL/L — SIGNIFICANT CHANGE UP (ref 3.5–5.3)
POTASSIUM SERPL-SCNC: 4.6 MMOL/L — SIGNIFICANT CHANGE UP (ref 3.5–5.3)
POTASSIUM UR-SCNC: 43.9 MMOL/L — SIGNIFICANT CHANGE UP
PROCALCITONIN SERPL-MCNC: 0.17 NG/ML — HIGH (ref 0–0.04)
PROCALCITONIN SERPL-MCNC: 0.27 NG/ML — HIGH (ref 0–0.04)
PROT ?TM UR-MCNC: 40 MG/DL — HIGH (ref 0–12)
PROT ?TM UR-MCNC: 41 MG/DL — HIGH (ref 0–12)
PROT FLD-MCNC: <1 G/DL — SIGNIFICANT CHANGE UP
PROT SERPL-MCNC: 5.7 G/DL — LOW (ref 6–8.3)
PROT SERPL-MCNC: 6.1 G/DL — SIGNIFICANT CHANGE UP (ref 6–8.3)
PROT SERPL-MCNC: 6.3 G/DL — SIGNIFICANT CHANGE UP (ref 6–8.3)
PROT SERPL-MCNC: 6.4 G/DL — SIGNIFICANT CHANGE UP (ref 6–8.3)
PROT SERPL-MCNC: 6.5 G/DL — SIGNIFICANT CHANGE UP (ref 6–8.3)
PROT SERPL-MCNC: 6.6 G/DL
PROT SERPL-MCNC: 6.7 G/DL
PROT UR-MCNC: 30 MG/DL
PROT/CREAT UR-RTO: 0.5 RATIO — HIGH (ref 0–0.2)
PROTHROM AB SERPL-ACNC: 16.9 SEC — HIGH (ref 10.6–13.6)
PROTHROM AB SERPL-ACNC: 17.6 SEC — HIGH (ref 10.6–13.6)
PROTHROM AB SERPL-ACNC: 23.3 SEC — HIGH (ref 10.6–13.6)
PROTHROM AB SERPL-ACNC: 28.5 SEC — HIGH (ref 10.6–13.6)
RAPID RVP RESULT: SIGNIFICANT CHANGE UP
RAPID RVP RESULT: SIGNIFICANT CHANGE UP
RBC # BLD: 2.57 M/UL — LOW (ref 3.8–5.2)
RBC # BLD: 2.71 M/UL — LOW (ref 3.8–5.2)
RBC # BLD: 3.19 M/UL — LOW (ref 3.8–5.2)
RBC # BLD: 3.28 M/UL — LOW (ref 3.8–5.2)
RBC # BLD: 3.36 M/UL — LOW (ref 3.8–5.2)
RBC # BLD: 4.09 M/UL
RBC # BLD: 4.31 M/UL
RBC # FLD: 15.3 %
RBC # FLD: 15.5 %
RBC # FLD: 17.6 % — HIGH (ref 10.3–14.5)
RBC # FLD: 17.8 % — HIGH (ref 10.3–14.5)
RBC # FLD: 17.9 % — HIGH (ref 10.3–14.5)
RBC # FLD: 18.3 % — HIGH (ref 10.3–14.5)
RBC # FLD: 18.6 % — HIGH (ref 10.3–14.5)
RBC CASTS # UR COMP ASSIST: ABNORMAL /HPF (ref 0–4)
RCV VOL RI: 2000 /UL — HIGH (ref 0–0)
SAO2 % BLDA: 98.1 % — HIGH (ref 94–98)
SAO2 % BLDA: 99.2 % — HIGH (ref 94–98)
SAO2 % BLDA: 99.7 % — HIGH (ref 94–98)
SARS-COV-2 IGG+IGM SERPL QL IA: 53.6 INDEX — HIGH
SARS-COV-2 IGG+IGM SERPL QL IA: >250 U/ML — HIGH
SARS-COV-2 IGG+IGM SERPL QL IA: POSITIVE
SARS-COV-2 IGG+IGM SERPL QL IA: POSITIVE
SARS-COV-2 N GENE NPH QL NAA+PROBE: NOT DETECTED
SARS-COV-2 RNA SPEC QL NAA+PROBE: SIGNIFICANT CHANGE UP
SMOOTH MUSCLE AB SER QL IF: NORMAL
SODIUM SERPL-SCNC: 129 MMOL/L — LOW (ref 135–145)
SODIUM SERPL-SCNC: 132 MMOL/L — LOW (ref 135–145)
SODIUM SERPL-SCNC: 132 MMOL/L — LOW (ref 135–145)
SODIUM SERPL-SCNC: 133 MMOL/L — LOW (ref 135–145)
SODIUM SERPL-SCNC: 135 MMOL/L — SIGNIFICANT CHANGE UP (ref 135–145)
SODIUM SERPL-SCNC: 137 MMOL/L
SODIUM SERPL-SCNC: 137 MMOL/L
SODIUM UR-SCNC: <20 MMOL/L — SIGNIFICANT CHANGE UP
SP GR SPEC: 1.01 — SIGNIFICANT CHANGE UP (ref 1.01–1.02)
SPECIMEN SOURCE: SIGNIFICANT CHANGE UP
TIBC SERPL-MCNC: 199 UG/DL
TOTAL NUCLEATED CELL COUNT, BODY FLUID: 39 /UL — SIGNIFICANT CHANGE UP
TRIGL SERPL-MCNC: 122 MG/DL
TROPONIN I, HIGH SENSITIVITY RESULT: 249.9 NG/L — HIGH
TROPONIN I, HIGH SENSITIVITY RESULT: 265.9 NG/L — HIGH
TROPONIN I, HIGH SENSITIVITY RESULT: 268 NG/L — HIGH
TUBE TYPE: SIGNIFICANT CHANGE UP
UIBC SERPL-MCNC: 113 UG/DL
UROBILINOGEN FLD QL: NEGATIVE — SIGNIFICANT CHANGE UP
VANCOMYCIN TROUGH SERPL-MCNC: 13.5 UG/ML — SIGNIFICANT CHANGE UP (ref 10–20)
WBC # BLD: 10.54 K/UL — HIGH (ref 3.8–10.5)
WBC # BLD: 11.95 K/UL — HIGH (ref 3.8–10.5)
WBC # BLD: 5.22 K/UL — SIGNIFICANT CHANGE UP (ref 3.8–10.5)
WBC # BLD: 7.57 K/UL — SIGNIFICANT CHANGE UP (ref 3.8–10.5)
WBC # BLD: 8.17 K/UL — SIGNIFICANT CHANGE UP (ref 3.8–10.5)
WBC # FLD AUTO: 10.54 K/UL — HIGH (ref 3.8–10.5)
WBC # FLD AUTO: 11.95 K/UL — HIGH (ref 3.8–10.5)
WBC # FLD AUTO: 5.22 K/UL — SIGNIFICANT CHANGE UP (ref 3.8–10.5)
WBC # FLD AUTO: 5.49 K/UL
WBC # FLD AUTO: 5.5 K/UL
WBC # FLD AUTO: 7.57 K/UL — SIGNIFICANT CHANGE UP (ref 3.8–10.5)
WBC # FLD AUTO: 8.17 K/UL — SIGNIFICANT CHANGE UP (ref 3.8–10.5)
WBC UR QL: SIGNIFICANT CHANGE UP

## 2021-01-01 PROCEDURE — 74176 CT ABD & PELVIS W/O CONTRAST: CPT | Mod: MA

## 2021-01-01 PROCEDURE — 86769 SARS-COV-2 COVID-19 ANTIBODY: CPT

## 2021-01-01 PROCEDURE — 99232 SBSQ HOSP IP/OBS MODERATE 35: CPT

## 2021-01-01 PROCEDURE — 99223 1ST HOSP IP/OBS HIGH 75: CPT | Mod: GC

## 2021-01-01 PROCEDURE — 71045 X-RAY EXAM CHEST 1 VIEW: CPT

## 2021-01-01 PROCEDURE — 99072 ADDL SUPL MATRL&STAF TM PHE: CPT

## 2021-01-01 PROCEDURE — 93306 TTE W/DOPPLER COMPLETE: CPT | Mod: 26

## 2021-01-01 PROCEDURE — 99213 OFFICE O/P EST LOW 20 MIN: CPT | Mod: 25

## 2021-01-01 PROCEDURE — 87206 SMEAR FLUORESCENT/ACID STAI: CPT

## 2021-01-01 PROCEDURE — 71046 X-RAY EXAM CHEST 2 VIEWS: CPT | Mod: 26

## 2021-01-01 PROCEDURE — 85610 PROTHROMBIN TIME: CPT

## 2021-01-01 PROCEDURE — 83986 ASSAY PH BODY FLUID NOS: CPT

## 2021-01-01 PROCEDURE — 99214 OFFICE O/P EST MOD 30 MIN: CPT

## 2021-01-01 PROCEDURE — 82042 OTHER SOURCE ALBUMIN QUAN EA: CPT

## 2021-01-01 PROCEDURE — 84300 ASSAY OF URINE SODIUM: CPT

## 2021-01-01 PROCEDURE — 36415 COLL VENOUS BLD VENIPUNCTURE: CPT

## 2021-01-01 PROCEDURE — 83605 ASSAY OF LACTIC ACID: CPT

## 2021-01-01 PROCEDURE — 83690 ASSAY OF LIPASE: CPT

## 2021-01-01 PROCEDURE — 93306 TTE W/DOPPLER COMPLETE: CPT

## 2021-01-01 PROCEDURE — 81001 URINALYSIS AUTO W/SCOPE: CPT

## 2021-01-01 PROCEDURE — 83615 LACTATE (LD) (LDH) ENZYME: CPT

## 2021-01-01 PROCEDURE — 99223 1ST HOSP IP/OBS HIGH 75: CPT

## 2021-01-01 PROCEDURE — 84484 ASSAY OF TROPONIN QUANT: CPT

## 2021-01-01 PROCEDURE — 83735 ASSAY OF MAGNESIUM: CPT

## 2021-01-01 PROCEDURE — 85730 THROMBOPLASTIN TIME PARTIAL: CPT

## 2021-01-01 PROCEDURE — 99283 EMERGENCY DEPT VISIT LOW MDM: CPT | Mod: 25

## 2021-01-01 PROCEDURE — 93005 ELECTROCARDIOGRAM TRACING: CPT

## 2021-01-01 PROCEDURE — 94760 N-INVAS EAR/PLS OXIMETRY 1: CPT

## 2021-01-01 PROCEDURE — 70450 CT HEAD/BRAIN W/O DYE: CPT | Mod: MA

## 2021-01-01 PROCEDURE — 99291 CRITICAL CARE FIRST HOUR: CPT

## 2021-01-01 PROCEDURE — 84145 PROCALCITONIN (PCT): CPT

## 2021-01-01 PROCEDURE — 82803 BLOOD GASES ANY COMBINATION: CPT

## 2021-01-01 PROCEDURE — P9047: CPT

## 2021-01-01 PROCEDURE — 99204 OFFICE O/P NEW MOD 45 MIN: CPT

## 2021-01-01 PROCEDURE — 93010 ELECTROCARDIOGRAM REPORT: CPT

## 2021-01-01 PROCEDURE — 82107 ALPHA-FETOPROTEIN L3: CPT

## 2021-01-01 PROCEDURE — 87086 URINE CULTURE/COLONY COUNT: CPT

## 2021-01-01 PROCEDURE — 88305 TISSUE EXAM BY PATHOLOGIST: CPT | Mod: 26

## 2021-01-01 PROCEDURE — 82945 GLUCOSE OTHER FLUID: CPT

## 2021-01-01 PROCEDURE — 85025 COMPLETE CBC W/AUTO DIFF WBC: CPT

## 2021-01-01 PROCEDURE — 82248 BILIRUBIN DIRECT: CPT

## 2021-01-01 PROCEDURE — 70450 CT HEAD/BRAIN W/O DYE: CPT | Mod: 26,MA

## 2021-01-01 PROCEDURE — 84157 ASSAY OF PROTEIN OTHER: CPT

## 2021-01-01 PROCEDURE — 76700 US EXAM ABDOM COMPLETE: CPT | Mod: 26

## 2021-01-01 PROCEDURE — 87116 MYCOBACTERIA CULTURE: CPT

## 2021-01-01 PROCEDURE — 82140 ASSAY OF AMMONIA: CPT

## 2021-01-01 PROCEDURE — 80053 COMPREHEN METABOLIC PANEL: CPT

## 2021-01-01 PROCEDURE — 99213 OFFICE O/P EST LOW 20 MIN: CPT

## 2021-01-01 PROCEDURE — 80202 ASSAY OF VANCOMYCIN: CPT

## 2021-01-01 PROCEDURE — 87102 FUNGUS ISOLATION CULTURE: CPT

## 2021-01-01 PROCEDURE — U0005: CPT

## 2021-01-01 PROCEDURE — 45385 COLONOSCOPY W/LESION REMOVAL: CPT

## 2021-01-01 PROCEDURE — U0003: CPT

## 2021-01-01 PROCEDURE — 82533 TOTAL CORTISOL: CPT

## 2021-01-01 PROCEDURE — 99215 OFFICE O/P EST HI 40 MIN: CPT

## 2021-01-01 PROCEDURE — 82962 GLUCOSE BLOOD TEST: CPT

## 2021-01-01 PROCEDURE — 99292 CRITICAL CARE ADDL 30 MIN: CPT

## 2021-01-01 PROCEDURE — 80307 DRUG TEST PRSMV CHEM ANLYZR: CPT

## 2021-01-01 PROCEDURE — 82550 ASSAY OF CK (CPK): CPT

## 2021-01-01 PROCEDURE — P9045: CPT

## 2021-01-01 PROCEDURE — 86038 ANTINUCLEAR ANTIBODIES: CPT

## 2021-01-01 PROCEDURE — 88108 CYTOPATH CONCENTRATE TECH: CPT

## 2021-01-01 PROCEDURE — 84133 ASSAY OF URINE POTASSIUM: CPT

## 2021-01-01 PROCEDURE — 84100 ASSAY OF PHOSPHORUS: CPT

## 2021-01-01 PROCEDURE — 85027 COMPLETE CBC AUTOMATED: CPT

## 2021-01-01 PROCEDURE — 83935 ASSAY OF URINE OSMOLALITY: CPT

## 2021-01-01 PROCEDURE — 82553 CREATINE MB FRACTION: CPT

## 2021-01-01 PROCEDURE — 87070 CULTURE OTHR SPECIMN AEROBIC: CPT

## 2021-01-01 PROCEDURE — C8929: CPT

## 2021-01-01 PROCEDURE — 89051 BODY FLUID CELL COUNT: CPT

## 2021-01-01 PROCEDURE — 88305 TISSUE EXAM BY PATHOLOGIST: CPT

## 2021-01-01 PROCEDURE — 74176 CT ABD & PELVIS W/O CONTRAST: CPT | Mod: 26,MA

## 2021-01-01 PROCEDURE — 88108 CYTOPATH CONCENTRATE TECH: CPT | Mod: 26

## 2021-01-01 PROCEDURE — 71045 X-RAY EXAM CHEST 1 VIEW: CPT | Mod: 26

## 2021-01-01 PROCEDURE — 99291 CRITICAL CARE FIRST HOUR: CPT | Mod: 25

## 2021-01-01 PROCEDURE — 87075 CULTR BACTERIA EXCEPT BLOOD: CPT

## 2021-01-01 PROCEDURE — 43239 EGD BIOPSY SINGLE/MULTIPLE: CPT

## 2021-01-01 PROCEDURE — 99285 EMERGENCY DEPT VISIT HI MDM: CPT

## 2021-01-01 PROCEDURE — 87205 SMEAR GRAM STAIN: CPT

## 2021-01-01 PROCEDURE — 82570 ASSAY OF URINE CREATININE: CPT

## 2021-01-01 PROCEDURE — 86803 HEPATITIS C AB TEST: CPT

## 2021-01-01 PROCEDURE — 36600 WITHDRAWAL OF ARTERIAL BLOOD: CPT

## 2021-01-01 PROCEDURE — 87015 SPECIMEN INFECT AGNT CONCNTJ: CPT

## 2021-01-01 PROCEDURE — 87040 BLOOD CULTURE FOR BACTERIA: CPT

## 2021-01-01 PROCEDURE — 84156 ASSAY OF PROTEIN URINE: CPT

## 2021-01-01 PROCEDURE — 0225U NFCT DS DNA&RNA 21 SARSCOV2: CPT

## 2021-01-01 RX ORDER — FUROSEMIDE 40 MG
80 TABLET ORAL ONCE
Refills: 0 | Status: COMPLETED | OUTPATIENT
Start: 2021-01-01 | End: 2021-01-01

## 2021-01-01 RX ORDER — LACTULOSE 10 G/15ML
30 SOLUTION ORAL ONCE
Refills: 0 | Status: COMPLETED | OUTPATIENT
Start: 2021-01-01 | End: 2021-01-01

## 2021-01-01 RX ORDER — ALBUMIN HUMAN 25 %
250 VIAL (ML) INTRAVENOUS ONCE
Refills: 0 | Status: COMPLETED | OUTPATIENT
Start: 2021-01-01 | End: 2021-01-01

## 2021-01-01 RX ORDER — FUROSEMIDE 40 MG
40 TABLET ORAL
Refills: 0 | Status: DISCONTINUED | OUTPATIENT
Start: 2021-01-01 | End: 2021-01-01

## 2021-01-01 RX ORDER — OCTREOTIDE ACETATE 200 UG/ML
100 INJECTION, SOLUTION INTRAVENOUS; SUBCUTANEOUS EVERY 6 HOURS
Refills: 0 | Status: DISCONTINUED | OUTPATIENT
Start: 2021-01-01 | End: 2021-01-01

## 2021-01-01 RX ORDER — LANOLIN ALCOHOL/MO/W.PET/CERES
5 CREAM (GRAM) TOPICAL AT BEDTIME
Refills: 0 | Status: DISCONTINUED | OUTPATIENT
Start: 2021-01-01 | End: 2021-01-01

## 2021-01-01 RX ORDER — SODIUM SULFATE, POTASSIUM SULFATE, MAGNESIUM SULFATE 17.5; 3.13; 1.6 G/ML; G/ML; G/ML
17.5-3.13-1.6 SOLUTION, CONCENTRATE ORAL
Qty: 1 | Refills: 0 | Status: ACTIVE | COMMUNITY
Start: 2021-01-01 | End: 1900-01-01

## 2021-01-01 RX ORDER — NADOLOL 80 MG/1
1 TABLET ORAL
Qty: 0 | Refills: 0 | DISCHARGE

## 2021-01-01 RX ORDER — HYDROMORPHONE HYDROCHLORIDE 2 MG/ML
0.5 INJECTION INTRAMUSCULAR; INTRAVENOUS; SUBCUTANEOUS ONCE
Refills: 0 | Status: DISCONTINUED | OUTPATIENT
Start: 2021-01-01 | End: 2021-01-01

## 2021-01-01 RX ORDER — MEROPENEM 1 G/30ML
500 INJECTION INTRAVENOUS EVERY 12 HOURS
Refills: 0 | Status: DISCONTINUED | OUTPATIENT
Start: 2021-01-01 | End: 2021-01-01

## 2021-01-01 RX ORDER — ALBUMIN HUMAN 25 %
50 VIAL (ML) INTRAVENOUS EVERY 6 HOURS
Refills: 0 | Status: DISCONTINUED | OUTPATIENT
Start: 2021-01-01 | End: 2021-01-01

## 2021-01-01 RX ORDER — CEFTRIAXONE 500 MG/1
1000 INJECTION, POWDER, FOR SOLUTION INTRAMUSCULAR; INTRAVENOUS EVERY 24 HOURS
Refills: 0 | Status: DISCONTINUED | OUTPATIENT
Start: 2021-01-01 | End: 2021-01-01

## 2021-01-01 RX ORDER — ESOMEPRAZOLE MAGNESIUM 20 MG/1
20 CAPSULE, DELAYED RELEASE ORAL DAILY
Qty: 30 | Refills: 0 | Status: DISCONTINUED | COMMUNITY
Start: 2021-01-01 | End: 2021-01-01

## 2021-01-01 RX ORDER — MEROPENEM 1 G/30ML
INJECTION INTRAVENOUS
Refills: 0 | Status: DISCONTINUED | OUTPATIENT
Start: 2021-01-01 | End: 2021-01-01

## 2021-01-01 RX ORDER — MIDODRINE HYDROCHLORIDE 2.5 MG/1
5 TABLET ORAL EVERY 8 HOURS
Refills: 0 | Status: DISCONTINUED | OUTPATIENT
Start: 2021-01-01 | End: 2021-01-01

## 2021-01-01 RX ORDER — ONDANSETRON 8 MG/1
4 TABLET, FILM COATED ORAL ONCE
Refills: 0 | Status: COMPLETED | OUTPATIENT
Start: 2021-01-01 | End: 2021-01-01

## 2021-01-01 RX ORDER — POTASSIUM CHLORIDE 20 MEQ
40 PACKET (EA) ORAL ONCE
Refills: 0 | Status: COMPLETED | OUTPATIENT
Start: 2021-01-01 | End: 2021-01-01

## 2021-01-01 RX ORDER — SODIUM CHLORIDE 9 MG/ML
1000 INJECTION, SOLUTION INTRAVENOUS ONCE
Refills: 0 | Status: COMPLETED | OUTPATIENT
Start: 2021-01-01 | End: 2021-01-01

## 2021-01-01 RX ORDER — VASOPRESSIN 20 [USP'U]/ML
0.04 INJECTION INTRAVENOUS
Qty: 50 | Refills: 0 | Status: DISCONTINUED | OUTPATIENT
Start: 2021-01-01 | End: 2021-01-01

## 2021-01-01 RX ORDER — NADOLOL 40 MG/1
40 TABLET ORAL DAILY
Qty: 60 | Refills: 3 | Status: ACTIVE | COMMUNITY
Start: 2021-01-01 | End: 1900-01-01

## 2021-01-01 RX ORDER — VANCOMYCIN HCL 1 G
1000 VIAL (EA) INTRAVENOUS ONCE
Refills: 0 | Status: COMPLETED | OUTPATIENT
Start: 2021-01-01 | End: 2021-01-01

## 2021-01-01 RX ORDER — NOREPINEPHRINE BITARTRATE/D5W 8 MG/250ML
0.05 PLASTIC BAG, INJECTION (ML) INTRAVENOUS
Qty: 16 | Refills: 0 | Status: DISCONTINUED | OUTPATIENT
Start: 2021-01-01 | End: 2021-01-01

## 2021-01-01 RX ORDER — HEPARIN SODIUM 5000 [USP'U]/ML
5000 INJECTION INTRAVENOUS; SUBCUTANEOUS EVERY 8 HOURS
Refills: 0 | Status: DISCONTINUED | OUTPATIENT
Start: 2021-01-01 | End: 2021-01-01

## 2021-01-01 RX ORDER — OCTREOTIDE ACETATE 200 UG/ML
100 INJECTION, SOLUTION INTRAVENOUS; SUBCUTANEOUS EVERY 8 HOURS
Refills: 0 | Status: DISCONTINUED | OUTPATIENT
Start: 2021-01-01 | End: 2021-01-01

## 2021-01-01 RX ORDER — FUROSEMIDE 40 MG/1
40 TABLET ORAL TWICE DAILY
Qty: 180 | Refills: 1 | Status: ACTIVE | COMMUNITY
Start: 2021-01-01 | End: 1900-01-01

## 2021-01-01 RX ORDER — CHLORHEXIDINE GLUCONATE 213 G/1000ML
1 SOLUTION TOPICAL
Refills: 0 | Status: DISCONTINUED | OUTPATIENT
Start: 2021-01-01 | End: 2021-01-01

## 2021-01-01 RX ORDER — NOREPINEPHRINE BITARTRATE/D5W 8 MG/250ML
0.05 PLASTIC BAG, INJECTION (ML) INTRAVENOUS
Qty: 8 | Refills: 0 | Status: DISCONTINUED | OUTPATIENT
Start: 2021-01-01 | End: 2021-01-01

## 2021-01-01 RX ORDER — SPIRONOLACTONE 25 MG/1
100 TABLET, FILM COATED ORAL
Refills: 0 | Status: DISCONTINUED | OUTPATIENT
Start: 2021-01-01 | End: 2021-01-01

## 2021-01-01 RX ORDER — ROBINUL 0.2 MG/ML
0.2 INJECTION INTRAMUSCULAR; INTRAVENOUS ONCE
Refills: 0 | Status: COMPLETED | OUTPATIENT
Start: 2021-01-01 | End: 2021-01-01

## 2021-01-01 RX ORDER — SODIUM CHLORIDE 9 MG/ML
500 INJECTION INTRAMUSCULAR; INTRAVENOUS; SUBCUTANEOUS ONCE
Refills: 0 | Status: COMPLETED | OUTPATIENT
Start: 2021-01-01 | End: 2021-01-01

## 2021-01-01 RX ORDER — SPIRONOLACTONE 25 MG/1
1 TABLET, FILM COATED ORAL
Qty: 0 | Refills: 0 | DISCHARGE

## 2021-01-01 RX ORDER — LACTULOSE 10 G/15ML
30 SOLUTION ORAL EVERY 8 HOURS
Refills: 0 | Status: DISCONTINUED | OUTPATIENT
Start: 2021-01-01 | End: 2021-01-01

## 2021-01-01 RX ORDER — SPIRONOLACTONE 100 MG/1
100 TABLET ORAL TWICE DAILY
Qty: 180 | Refills: 1 | Status: ACTIVE | COMMUNITY
Start: 2021-01-01 | End: 1900-01-01

## 2021-01-01 RX ORDER — DEXMEDETOMIDINE HYDROCHLORIDE IN 0.9% SODIUM CHLORIDE 4 UG/ML
0.01 INJECTION INTRAVENOUS
Qty: 400 | Refills: 0 | Status: DISCONTINUED | OUTPATIENT
Start: 2021-01-01 | End: 2021-01-01

## 2021-01-01 RX ORDER — FUROSEMIDE 40 MG
1 TABLET ORAL
Qty: 0 | Refills: 0 | DISCHARGE

## 2021-01-01 RX ORDER — LACTULOSE 10 G/15ML
10 SOLUTION ORAL DAILY
Qty: 300 | Refills: 0 | Status: DISCONTINUED | COMMUNITY
Start: 2021-01-01 | End: 2021-01-01

## 2021-01-01 RX ORDER — POTASSIUM CHLORIDE 750 MG/1
10 TABLET, FILM COATED, EXTENDED RELEASE ORAL TWICE DAILY
Qty: 4 | Refills: 0 | Status: DISCONTINUED | COMMUNITY
Start: 2021-01-01 | End: 2021-01-01

## 2021-01-01 RX ORDER — MEROPENEM 1 G/30ML
500 INJECTION INTRAVENOUS ONCE
Refills: 0 | Status: COMPLETED | OUTPATIENT
Start: 2021-01-01 | End: 2021-01-01

## 2021-01-01 RX ADMIN — SODIUM SULFATE, POTASSIUM SULFATE, MAGNESIUM SULFATE 0 GM/177ML: 17.5; 3.13; 1.6 SOLUTION, CONCENTRATE ORAL at 00:00

## 2021-01-01 RX ADMIN — Medication 50 MILLILITER(S): at 17:14

## 2021-01-01 RX ADMIN — HEPARIN SODIUM 5000 UNIT(S): 5000 INJECTION INTRAVENOUS; SUBCUTANEOUS at 14:17

## 2021-01-01 RX ADMIN — Medication 40 MILLIEQUIVALENT(S): at 16:10

## 2021-01-01 RX ADMIN — OCTREOTIDE ACETATE 100 MICROGRAM(S): 200 INJECTION, SOLUTION INTRAVENOUS; SUBCUTANEOUS at 08:23

## 2021-01-01 RX ADMIN — ONDANSETRON 4 MILLIGRAM(S): 8 TABLET, FILM COATED ORAL at 07:45

## 2021-01-01 RX ADMIN — HEPARIN SODIUM 5000 UNIT(S): 5000 INJECTION INTRAVENOUS; SUBCUTANEOUS at 06:39

## 2021-01-01 RX ADMIN — Medication 6.25 MICROGRAM(S)/KG/MIN: at 23:20

## 2021-01-01 RX ADMIN — HYDROMORPHONE HYDROCHLORIDE 0.5 MILLIGRAM(S): 2 INJECTION INTRAMUSCULAR; INTRAVENOUS; SUBCUTANEOUS at 18:08

## 2021-01-01 RX ADMIN — LACTULOSE 20 GRAM(S): 10 SOLUTION ORAL at 20:44

## 2021-01-01 RX ADMIN — CHLORHEXIDINE GLUCONATE 1 APPLICATION(S): 213 SOLUTION TOPICAL at 05:14

## 2021-01-01 RX ADMIN — LACTULOSE 30 GRAM(S): 10 SOLUTION ORAL at 00:47

## 2021-01-01 RX ADMIN — Medication 250 MILLIGRAM(S): at 17:15

## 2021-01-01 RX ADMIN — ROBINUL 0.2 MILLIGRAM(S): 0.2 INJECTION INTRAMUSCULAR; INTRAVENOUS at 17:33

## 2021-01-01 RX ADMIN — Medication 6.25 MICROGRAM(S)/KG/MIN: at 23:45

## 2021-01-01 RX ADMIN — Medication 6.25 MICROGRAM(S)/KG/MIN: at 05:09

## 2021-01-01 RX ADMIN — DEXMEDETOMIDINE HYDROCHLORIDE IN 0.9% SODIUM CHLORIDE 0.1 MICROGRAM(S)/KG/HR: 4 INJECTION INTRAVENOUS at 18:20

## 2021-01-01 RX ADMIN — HYDROMORPHONE HYDROCHLORIDE 0.5 MILLIGRAM(S): 2 INJECTION INTRAMUSCULAR; INTRAVENOUS; SUBCUTANEOUS at 18:12

## 2021-01-01 RX ADMIN — DEXMEDETOMIDINE HYDROCHLORIDE IN 0.9% SODIUM CHLORIDE 0.1 MICROGRAM(S)/KG/HR: 4 INJECTION INTRAVENOUS at 08:46

## 2021-01-01 RX ADMIN — Medication 50 MILLILITER(S): at 05:08

## 2021-01-01 RX ADMIN — Medication 6.25 MICROGRAM(S)/KG/MIN: at 13:00

## 2021-01-01 RX ADMIN — Medication 50 MILLILITER(S): at 05:14

## 2021-01-01 RX ADMIN — HEPARIN SODIUM 5000 UNIT(S): 5000 INJECTION INTRAVENOUS; SUBCUTANEOUS at 21:11

## 2021-01-01 RX ADMIN — Medication 80 MILLIGRAM(S): at 12:21

## 2021-01-01 RX ADMIN — HEPARIN SODIUM 5000 UNIT(S): 5000 INJECTION INTRAVENOUS; SUBCUTANEOUS at 05:14

## 2021-01-01 RX ADMIN — CHLORHEXIDINE GLUCONATE 1 APPLICATION(S): 213 SOLUTION TOPICAL at 06:39

## 2021-01-01 RX ADMIN — SODIUM CHLORIDE 500 MILLILITER(S): 9 INJECTION INTRAMUSCULAR; INTRAVENOUS; SUBCUTANEOUS at 21:14

## 2021-01-01 RX ADMIN — CEFTRIAXONE 100 MILLIGRAM(S): 500 INJECTION, POWDER, FOR SOLUTION INTRAMUSCULAR; INTRAVENOUS at 03:17

## 2021-01-01 RX ADMIN — MEROPENEM 100 MILLIGRAM(S): 1 INJECTION INTRAVENOUS at 08:23

## 2021-01-01 RX ADMIN — OCTREOTIDE ACETATE 100 MICROGRAM(S): 200 INJECTION, SOLUTION INTRAVENOUS; SUBCUTANEOUS at 14:36

## 2021-01-01 RX ADMIN — OCTREOTIDE ACETATE 100 MICROGRAM(S): 200 INJECTION, SOLUTION INTRAVENOUS; SUBCUTANEOUS at 03:46

## 2021-01-01 RX ADMIN — HEPARIN SODIUM 5000 UNIT(S): 5000 INJECTION INTRAVENOUS; SUBCUTANEOUS at 14:19

## 2021-01-01 RX ADMIN — OCTREOTIDE ACETATE 100 MICROGRAM(S): 200 INJECTION, SOLUTION INTRAVENOUS; SUBCUTANEOUS at 20:32

## 2021-01-01 RX ADMIN — Medication 5 MILLIGRAM(S): at 21:12

## 2021-01-01 RX ADMIN — CEFTRIAXONE 100 MILLIGRAM(S): 500 INJECTION, POWDER, FOR SOLUTION INTRAMUSCULAR; INTRAVENOUS at 03:00

## 2021-01-01 RX ADMIN — LACTULOSE 30 GRAM(S): 10 SOLUTION ORAL at 05:09

## 2021-01-01 RX ADMIN — Medication 50 MILLILITER(S): at 12:21

## 2021-01-01 RX ADMIN — LACTULOSE 30 GRAM(S): 10 SOLUTION ORAL at 21:11

## 2021-01-01 RX ADMIN — OCTREOTIDE ACETATE 100 MICROGRAM(S): 200 INJECTION, SOLUTION INTRAVENOUS; SUBCUTANEOUS at 19:58

## 2021-01-01 RX ADMIN — Medication 125 MILLILITER(S): at 09:45

## 2021-01-01 RX ADMIN — Medication 6.25 MICROGRAM(S)/KG/MIN: at 17:30

## 2021-01-01 RX ADMIN — Medication 50 MILLILITER(S): at 12:08

## 2021-01-01 RX ADMIN — HYDROMORPHONE HYDROCHLORIDE 0.5 MILLIGRAM(S): 2 INJECTION INTRAMUSCULAR; INTRAVENOUS; SUBCUTANEOUS at 00:56

## 2021-01-01 RX ADMIN — Medication 1 MILLIGRAM(S): at 14:46

## 2021-01-01 RX ADMIN — Medication 6.25 MICROGRAM(S)/KG/MIN: at 17:14

## 2021-01-01 RX ADMIN — OCTREOTIDE ACETATE 100 MICROGRAM(S): 200 INJECTION, SOLUTION INTRAVENOUS; SUBCUTANEOUS at 14:17

## 2021-01-01 RX ADMIN — LACTULOSE 30 GRAM(S): 10 SOLUTION ORAL at 14:19

## 2021-01-01 RX ADMIN — SODIUM CHLORIDE 1000 MILLILITER(S): 9 INJECTION INTRAMUSCULAR; INTRAVENOUS; SUBCUTANEOUS at 21:10

## 2021-01-01 RX ADMIN — OCTREOTIDE ACETATE 100 MICROGRAM(S): 200 INJECTION, SOLUTION INTRAVENOUS; SUBCUTANEOUS at 09:45

## 2021-01-01 RX ADMIN — CHLORHEXIDINE GLUCONATE 1 APPLICATION(S): 213 SOLUTION TOPICAL at 05:10

## 2021-01-01 RX ADMIN — OCTREOTIDE ACETATE 100 MICROGRAM(S): 200 INJECTION, SOLUTION INTRAVENOUS; SUBCUTANEOUS at 02:12

## 2021-01-01 RX ADMIN — Medication 50 MILLILITER(S): at 23:20

## 2021-01-01 RX ADMIN — Medication 6.25 MICROGRAM(S)/KG/MIN: at 15:25

## 2021-01-01 RX ADMIN — Medication 50 MILLILITER(S): at 23:24

## 2021-01-01 RX ADMIN — HYDROMORPHONE HYDROCHLORIDE 0.5 MILLIGRAM(S): 2 INJECTION INTRAMUSCULAR; INTRAVENOUS; SUBCUTANEOUS at 17:32

## 2021-01-01 RX ADMIN — SODIUM CHLORIDE 500 MILLILITER(S): 9 INJECTION INTRAMUSCULAR; INTRAVENOUS; SUBCUTANEOUS at 21:40

## 2021-01-01 RX ADMIN — Medication 50 MILLILITER(S): at 17:51

## 2021-01-01 RX ADMIN — Medication 50 MILLILITER(S): at 13:06

## 2021-01-01 RX ADMIN — HYDROMORPHONE HYDROCHLORIDE 0.5 MILLIGRAM(S): 2 INJECTION INTRAMUSCULAR; INTRAVENOUS; SUBCUTANEOUS at 00:45

## 2021-01-01 RX ADMIN — Medication 250 MILLILITER(S): at 15:44

## 2021-01-01 RX ADMIN — Medication 6.25 MICROGRAM(S)/KG/MIN: at 21:23

## 2021-01-01 RX ADMIN — HEPARIN SODIUM 5000 UNIT(S): 5000 INJECTION INTRAVENOUS; SUBCUTANEOUS at 22:54

## 2021-01-01 RX ADMIN — SODIUM CHLORIDE 1000 MILLILITER(S): 9 INJECTION, SOLUTION INTRAVENOUS at 22:53

## 2021-01-01 RX ADMIN — HEPARIN SODIUM 5000 UNIT(S): 5000 INJECTION INTRAVENOUS; SUBCUTANEOUS at 05:10

## 2021-01-01 RX ADMIN — HEPARIN SODIUM 5000 UNIT(S): 5000 INJECTION INTRAVENOUS; SUBCUTANEOUS at 15:25

## 2021-01-01 RX ADMIN — OCTREOTIDE ACETATE 100 MICROGRAM(S): 200 INJECTION, SOLUTION INTRAVENOUS; SUBCUTANEOUS at 15:24

## 2021-01-01 RX ADMIN — LACTULOSE 30 GRAM(S): 10 SOLUTION ORAL at 06:39

## 2021-01-01 RX ADMIN — DEXMEDETOMIDINE HYDROCHLORIDE IN 0.9% SODIUM CHLORIDE 0.1 MICROGRAM(S)/KG/HR: 4 INJECTION INTRAVENOUS at 00:21

## 2021-01-01 RX ADMIN — OCTREOTIDE ACETATE 100 MICROGRAM(S): 200 INJECTION, SOLUTION INTRAVENOUS; SUBCUTANEOUS at 10:00

## 2021-01-01 RX ADMIN — SODIUM CHLORIDE 1000 MILLILITER(S): 9 INJECTION INTRAMUSCULAR; INTRAVENOUS; SUBCUTANEOUS at 20:44

## 2021-01-01 RX ADMIN — MEROPENEM 100 MILLIGRAM(S): 1 INJECTION INTRAVENOUS at 20:32

## 2021-01-01 RX ADMIN — HEPARIN SODIUM 5000 UNIT(S): 5000 INJECTION INTRAVENOUS; SUBCUTANEOUS at 21:24

## 2021-01-01 RX ADMIN — VASOPRESSIN 2.4 UNIT(S)/MIN: 20 INJECTION INTRAVENOUS at 17:51

## 2021-01-25 NOTE — PHYSICAL EXAM
[No Acute Distress] : no acute distress [Normal Outer Ear/Nose] : the outer ears and nose were normal in appearance [No Respiratory Distress] : no respiratory distress  [No Accessory Muscle Use] : no accessory muscle use [Clear to Auscultation] : lungs were clear to auscultation bilaterally [Normal Rate] : normal rate  [Regular Rhythm] : with a regular rhythm [Normal S1, S2] : normal S1 and S2 [No Edema] : there was no peripheral edema [Soft] : abdomen soft [Non Tender] : non-tender [de-identified] : distenede no discrete masses but venous dilatation [de-identified] : slight jaundice?

## 2021-01-25 NOTE — REVIEW OF SYSTEMS
[Fever] : no fever [Chills] : no chills [Chest Pain] : no chest pain [Palpitations] : no palpitations [Lower Ext Edema] : no lower extremity edema [Shortness Of Breath] : no shortness of breath [Wheezing] : no wheezing [Cough] : no cough [Nausea] : no nausea [Vomiting] : no vomiting [Heartburn] : no heartburn [FreeTextEntry7] : rare episodes of actual pain but feels uncomfortable at times

## 2021-01-25 NOTE — HISTORY OF PRESENT ILLNESS
[de-identified] : Pt comes fo rconstipation and abdominal bloating for pst few days  Tried dulcolax and snnokot with some relief  NO fever  No previous hx of colonosocopy

## 2021-01-27 NOTE — PHYSICAL EXAM
[No Acute Distress] : no acute distress [Soft] : abdomen soft [Non Tender] : non-tender [de-identified] : slightly jaundiced [de-identified] : venous dilatation

## 2021-01-27 NOTE — HISTORY OF PRESENT ILLNESS
[de-identified] : Pt comes for recheck  Pt has cirrhosis on abd sono with ascites  Pt states she is not a heavy drinker.  Moved bowels with miralax but not a lot

## 2021-01-27 NOTE — REVIEW OF SYSTEMS
[Chills] : chills [Abdominal Pain] : abdominal pain [Constipation] : constipation [Fever] : no fever [Chest Pain] : no chest pain [Palpitations] : no palpitations [Shortness Of Breath] : no shortness of breath [Wheezing] : no wheezing

## 2021-01-27 NOTE — PLAN
[FreeTextEntry1] : cirrhosi with ascites\par start spironolactone\par KCL supplement fo r2 days\par Start Lactulose  \par Pt to see hepatologist tomorrow as he has cancellation

## 2021-01-28 PROBLEM — Z78.9 DOES NOT USE ILLICIT DRUGS: Status: ACTIVE | Noted: 2021-01-01

## 2021-01-28 PROBLEM — M62.50 MUSCLE WASTING: Status: ACTIVE | Noted: 2021-01-01
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
## 2021-01-28 NOTE — ASSESSMENT
[FreeTextEntry1] : Ms. NICHOLS is a 55 year old woman, healthy until she developed increased abdominal girth and infrequent, thin stools in mid-January 2021, and was found to have cirrhosis with ascites by Dr. Ruff.\par \par - decompensated cirrhosis\par    - etiology unclear:  High AST:ALT ratio 108/27, very thin muscles and protein calorie malnutrition with very low BUN, low normal creatinine make one wonder about alcohol, but she  denies significant use. Denies h/o obesity. HCV Ab negative.\par    - elevated LFTs\par - near-tense ascites; hypokalemia\par - ankle edema, 1+\par - decreased breath sounds both lungs, likely pleural effusions\par - HCC screening: US 1/2021 showed no lesion\par - low-normal BMI longstanding; 10 lbs weight gain between October 2020 and 1/2021 likekly due to fluid overload\par  \par - change in bowel habits to thin stools, needs evaluation for obstruction. No prior colonoscopy\par \par The nature of cirrhosis was discussed with possible development of hepatic encephalopathy, ascites, esophageal variceal bleeding, liver cancer, chronic kidney injury, fatal complications after significant surgery, need for liver transplant, and death.\par \par Colonoscopy prep instructions discussed.\par \par Plan:\par - labs as below incl. Mg and AFP\par - paracentesis, diagnostic and therapeutic; increase spironolactone to 100 mg/d, stop KCl, repeat labs in 1 week and before next visit in 3 weeks\par - EGD and colonoscopy\par - chest X-ray\par - constipation: continue laxatives\par \par

## 2021-01-28 NOTE — PHYSICAL EXAM
[Abdominal  Ascites] : ascites [Ascites Tense] : ascites is tense [Ascites Shifting Dullness] : shifting dullness if ascites [Liver Palpable ___ Finger Breadths Below Costal Margin] : Liver edge was palpable [unfilled] finger breadths below costal margin [Palmar Erythema] : Palmar Erythema [General Appearance - Alert] : alert [General Appearance - In No Acute Distress] : in no acute distress [Sclera] : the sclera and conjunctiva were normal [PERRL With Normal Accommodation] : pupils were equal in size, round, and reactive to light [Extraocular Movements] : extraocular movements were intact [Outer Ear] : the ears and nose were normal in appearance [Oropharynx] : the oropharynx was normal [Neck Appearance] : the appearance of the neck was normal [Neck Cervical Mass (___cm)] : no neck mass was observed [Jugular Venous Distention Increased] : there was no jugular-venous distention [Thyroid Diffuse Enlargement] : the thyroid was not enlarged [Thyroid Nodule] : there were no palpable thyroid nodules [Auscultation Breath Sounds / Voice Sounds] : lungs were clear to auscultation bilaterally [Heart Rate And Rhythm] : heart rate was normal and rhythm regular [Heart Sounds] : normal S1 and S2 [Heart Sounds Gallop] : no gallops [Murmurs] : no murmurs [Heart Sounds Pericardial Friction Rub] : no pericardial rub [Bowel Sounds] : normal bowel sounds [Abdomen Soft] : soft [Abdomen Tenderness] : non-tender [Abdomen Mass (___ Cm)] : no abdominal mass palpated [Cervical Lymph Nodes Enlarged Posterior Bilaterally] : posterior cervical [Cervical Lymph Nodes Enlarged Anterior Bilaterally] : anterior cervical [Supraclavicular Lymph Nodes Enlarged Bilaterally] : supraclavicular [Axillary Lymph Nodes Enlarged Bilaterally] : axillary [Femoral Lymph Nodes Enlarged Bilaterally] : femoral [Inguinal Lymph Nodes Enlarged Bilaterally] : inguinal [No CVA Tenderness] : no ~M costovertebral angle tenderness [No Spinal Tenderness] : no spinal tenderness [Abnormal Walk] : normal gait [Nail Clubbing] : no clubbing  or cyanosis of the fingernails [Musculoskeletal - Swelling] : no joint swelling seen [Motor Tone] : muscle strength and tone were normal [Skin Color & Pigmentation] : normal skin color and pigmentation [Skin Turgor] : normal skin turgor [] : no rash [Deep Tendon Reflexes (DTR)] : deep tendon reflexes were 2+ and symmetric [Sensation] : the sensory exam was normal to light touch and pinprick [No Focal Deficits] : no focal deficits [Oriented To Time, Place, And Person] : oriented to person, place, and time [Impaired Insight] : insight and judgment were intact [Affect] : the affect was normal [Scleral Icterus] : No Scleral Icterus [Splenomegaly] : no splenomegaly [Scrotal Edema] : Scrotum is not edematous [Asterixis] : no asterixis observed [Jaundice] : No jaundice [Depression] : no depression [FreeTextEntry1] : near-tense ascites, shifting dullness, firm liver enlarged 3 fingers below ribs, 1 cm umbilical hernia

## 2021-02-10 NOTE — ASU PATIENT PROFILE, ADULT - AS [facility]
----- Message from XIOMARA Baker sent at 1/26/2021 10:13 AM CST -----  Please call and convey recent labs results to patient:  A1c continues to be abnormal at 3.8 (4.5-5.6) though improved from last two results.  Lipid panel is only slight abnormal with total cholesterol at 206 (<199).  Recommend healthy diet and exercise.  Thank you.   (4) excellent

## 2021-02-22 NOTE — HISTORY OF PRESENT ILLNESS
[de-identified] : Pt comes for rechekc  Feelijng better after paracentesis  Had a number of tubular adenomas removed  Wnats to go back to work

## 2021-02-22 NOTE — REVIEW OF SYSTEMS
[Fever] : no fever [Chills] : no chills [Chest Pain] : no chest pain [Palpitations] : no palpitations [Lower Ext Edema] : no lower extremity edema [Shortness Of Breath] : no shortness of breath [Wheezing] : no wheezing [Cough] : no cough [Abdominal Pain] : no abdominal pain [Vomiting] : no vomiting

## 2021-02-22 NOTE — PHYSICAL EXAM
[No Acute Distress] : no acute distress [Ill-Appearing] : ill-appearing [Normal Sclera/Conjunctiva] : normal sclera/conjunctiva [Normal Outer Ear/Nose] : the outer ears and nose were normal in appearance [No JVD] : no jugular venous distention [No Respiratory Distress] : no respiratory distress  [No Accessory Muscle Use] : no accessory muscle use [Clear to Auscultation] : lungs were clear to auscultation bilaterally [Normal Rate] : normal rate  [Regular Rhythm] : with a regular rhythm [Soft] : abdomen soft [Non Tender] : non-tender [de-identified] : distended with fluid wave

## 2021-04-08 PROBLEM — K31.89 EROSIVE GASTROPATHY: Status: ACTIVE | Noted: 2021-01-01

## 2021-04-08 PROBLEM — R19.4 CHANGE IN BOWEL HABITS: Status: RESOLVED | Noted: 2021-01-01 | Resolved: 2021-01-01

## 2021-04-08 PROBLEM — Z87.19 HISTORY OF CONSTIPATION: Status: RESOLVED | Noted: 2021-01-01 | Resolved: 2021-01-01

## 2021-04-08 PROBLEM — R06.89 DECREASED LUNG SOUNDS: Status: ACTIVE | Noted: 2021-01-01

## 2021-04-08 PROBLEM — R60.0 LEG EDEMA: Status: ACTIVE | Noted: 2021-01-01

## 2021-04-08 PROBLEM — Z86.39 HISTORY OF HYPOKALEMIA: Status: RESOLVED | Noted: 2021-01-01 | Resolved: 2021-01-01
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
## 2021-04-08 NOTE — ASSESSMENT
[FreeTextEntry1] : Ms. NICHOLS is a 55 year old woman, healthy until she developed increased abdominal girth and infrequent, thin stools in mid-January 2021, and was found to have cirrhosis with ascites by Dr. Ruff.\par \par - decompensated cirrhosis. INR ordered, but not done, MELD not available.\par    - etiology: denies significant alcohol use, but very high PEth level > 900 ng/mL, high AST:ALT ratio 108/27,  suggestive of alcoholic cirrhosis. \par - malnutrition: very thin muscles, very low BUN, low normal creatinine. \par    - elevated LFTs\par - near-tense ascites; hypokalemia; s/p paracentesis 2/9/21, fluid protein 1.4 g/dL, PMN 4/uL\par - ankle edema, 1+\par - decreased breath sounds both lungs, likely due to high diaphragm due to ascites. CXR: mild atelectasis 2/2021\par - HCC screening: US 1/2021 showed no lesion\par - low-normal BMI longstanding; 10 lbs weight gain between October 2020 and 1/2021 likely due to fluid overload\par - heterozygous A1AT deficiency (MZ) - not a cause of cirrhosis, but may contribute to worsening of other liver disease.\par - immune to hepatitis B due to vaccination, naive to hepatitis A and C.\par - EGD 2/2021: medium-sized varices, now on nadolol; erosive gastritis, H. pylori negative\par - gastric metaplasia\par - colon adenomas: 10 mm with piecemeal resection, four 4mm polyps; fair prep \par - change in bowel habits to thin stools: \par \par \par I discussed that a liver biopsy is required to further evaluate her liver disease. Will get LVP before that.\par \par Plan:\par - ascites: add lasix 60, increase spironolactone to 150 mg/d, repeat labs in 4d, 2 weeks and before next visit in 1 month; She will call one day after the first two.\par    - LVP\par - MELD labs - at other lab b/o insurance. Pt. will have results faxed.\par - liver biopsy\par - repeat colonoscopy within 6 months\par - gastritis: stop PPI\par - varices: continue nadolol\par - stop lactulose as constipation resolved\par - HCC screening: US 7/2021\par \par

## 2021-05-18 NOTE — ASSESSMENT
[FreeTextEntry1] : Ms. NICHOLS is a 55 year old woman, healthy until she developed increased abdominal girth and infrequent, thin stools in mid-January 2021, and was found to have cirrhosis with ascites by Dr. Ruff.\par \par - decompensated cirrhosis. INR ordered, but not done, MELD not available.\par    - etiology: denies significant alcohol use, but very high PEth level > 900 ng/mL, high AST:ALT ratio 108/27,  suggestive of alcoholic cirrhosis. \par - malnutrition: very thin muscles, very low BUN, low normal creatinine. \par    - elevated LFTs\par - large, but improved ascites since lasix/spironolactone increased to 60/150 in April, has not required LVP since, edema resolved, creat 0.5 mg/dL on 5/15. Last paracentesis 2/9/21, fluid protein 1.4 g/dL, PMN 4/uL\par \par - decreased breath sounds both lungs, likely due to high diaphragm due to ascites. CXR: mild atelectasis 2/2021\par - HCC screening: US 1/2021 showed no lesion\par - low-normal BMI longstanding; 10 lbs weight gain between October 2020 and 1/2021 likely due to fluid overload\par - heterozygous A1AT deficiency (MZ) - not a cause of cirrhosis, but may contribute to worsening of other liver disease.\par - immune to hepatitis B due to vaccination, naive to hepatitis A and C.\par - EGD 2/2021: medium-sized varices, now on nadolol; erosive gastritis, H. pylori negative\par - gastric metaplasia\par - colon adenomas: 10 mm with piecemeal resection, four 4mm polyps; fair prep \par - change in bowel habits to thin stools: \par \par \par I discussed that a liver biopsy is required to further evaluate her liver disease. Will get LVP before that.\par \par Plan:\par - ascites: increase lasix/spironolactone to 40bid/100bid, return in 1 month after repeat labs. She will decrease to 40/100 if abdomen becomes flat before that\par \par - repeat colonoscopy within 6 months: will schedule\par - varices: continue nadolol\par - HCC screening: US 7/2021\par \par
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
## 2021-07-06 PROBLEM — D12.6 COLON ADENOMAS: Status: ACTIVE | Noted: 2021-01-01

## 2021-07-06 PROBLEM — E88.01 HETEROZYGOUS ALPHA 1-ANTITRYPSIN DEFICIENCY: Status: ACTIVE | Noted: 2021-01-01

## 2021-07-06 PROBLEM — K70.31 ALCOHOLIC CIRRHOSIS OF LIVER WITH ASCITES: Status: ACTIVE | Noted: 2021-01-01

## 2021-07-06 PROBLEM — I85.00 ESOPHAGEAL VARICES DETERMINED BY ENDOSCOPY: Status: ACTIVE | Noted: 2021-01-01

## 2021-07-06 PROBLEM — E46 PROTEIN-CALORIE MALNUTRITION: Status: ACTIVE | Noted: 2021-01-01

## 2021-07-06 PROBLEM — F10.10 ALCOHOL ABUSE: Status: ACTIVE | Noted: 2021-01-01

## 2021-07-06 NOTE — ASSESSMENT
[FreeTextEntry1] : Ms. NICHOLS is a 55 year old woman, healthy until she developed increased abdominal girth and infrequent, thin stools in mid-2021, and was found to have cirrhosis with ascites by Dr. Ruff.\par \par - decompensated alcohol-related cirrhosis. INR ordered, but not done, MELD not available.\par    - etiology:  admitted to significant alcohol use on 21, as above. High PEth level > 900 ng/mL, high AST:ALT ratio 108/27, cachexia are suggestive.\par - malnutrition: very thin muscles, very low BUN, low normal creatinine. \par    - elevated LFTs. Bloodwork not covered by insurance at [facility], last done 5/15/21\par - ascites: improved to small-moderate with lasix/spironolactone increased to 40 bid/100bid, edema resolved. Last paracentesis 21, fluid protein 1.4 g/dL, PMN 4/uL\par - EGD 2021: medium-sized varices, now on nadolol; erosive gastritis, H. pylori negative\par \par - HCC screening: US 2021 showed no lesion\par - low-normal BMI longstanding; 10 lbs weight gain between 2020 and 2021 likely due to fluid overload\par - heterozygous A1AT deficiency (MZ) - not a cause of cirrhosis, but may contribute to worsening of other liver disease.\par - immune to hepatitis B due to vaccination, naive to hepatitis A and C.\par \par - gastric metaplasia\par - colon adenomas: 10 mm with piecemeal resection, four 4mm polyps; fair prep \par - change in bowel habits to thin stools: \par \par - admitted to significant history of alcohol abuse on 21 and going to [employer] meetings for 3 years after her   in . I told her that any further alcohol use would destroy her liver completely and that she would die from it, or need a liver transplant. I told her that she can never drink any alocohol again, not even small amounts.\par \par The nature of cirrhosis was discussed with possible development of hepatic encephalopathy, ascites, esophageal variceal bleeding, liver cancer, chronic kidney injury, fatal complications after significant surgery, need for liver transplant, and death.\par I recommended to drink coffee, 4-6 cups per day if tolerated, as several retrospective studies suggest a dose-depended decrease of liver cancer in coffee-drinkers. Drinks 1 cup/day\par \par \par Plan:\par - bloodwork - she will do at [facility] and provide results. Labs also in 3 weeks before next visit\par - ascites: continue lasix/spironolactone to 40bid/100bid, return in 1 month after repeat labs. She will decrease to 40/100 if abdomen becomes flat before that\par \par - repeat colonoscopy within 6 months: will schedule\par - large varices: continue nadolol\par - HCC screening: US 2021\par - advised to resume AA meetings\par \par
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
## 2021-11-18 NOTE — ED PROVIDER NOTE - PROGRESS NOTE DETAILS
As per labs from Hepatology office creatnine 3.34 on 11/15 and 2.14 on 11/17. Bilirubin 1.6 on 11/17. reviewed with Dr Guevara and Dr Mcgrath. K 3.0 will give 40meq K x 1. patient would like ot be dc home. she has no complaints. Today creatnine 1.5. bilirubin 1.6  Patient to be dc home with close fu with PCP and hepatology.

## 2021-11-18 NOTE — ED PROVIDER NOTE - NSFOLLOWUPINSTRUCTIONS_ED_ALL_ED_FT
1. TAKE ALL MEDICATIONS AS DIRECTED. STAY WELL HYDRATED.   2. FOLLOW UP WITH ___your PCP and hepatologist _______ AS DIRECTED.  YOU WERE GIVEN COPIES OF ALL LABS AND IMAGING RESULTS FROM YOUR ER VISIT--PLEASE TAKE THEM WITH YOU TO YOUR APPOINTMENT.  3. IF NEEDED, CALL [phone] TO FIND A PRIMARY CARE PHYSICIAN.  OR CALL [phone] TO MAKE AN APPOINTMENT WITH THE MEDICAL CLINIC.  4. RETURN TO THE ER FOR ANY WORSENING SYMPTOMS.

## 2021-11-18 NOTE — ED PROVIDER NOTE - ATTENDING CONTRIBUTION TO CARE
56 female PMH alcoholic liver cirrosis, states on Friday she had outpatient abdominal paracentesis, states 12 liters was removed, had blood work done, states last night she was called from her hepatology office that her creatnine and liver enzymes were elevated and to go to ER, patient states she otherwise feels well and offers no complaints, last etoh was 6 years ago, patient alert and oriented, heart and lungs clear, abdomen distended and soft, no tenderness, patient states her abdomen had been more distended but improved after the paracentesis, repeat labs, call hepatology office to have their labs reports faxed, re-eval.

## 2021-11-18 NOTE — ED PROVIDER NOTE - PATIENT PORTAL LINK FT
You can access the FollowMyHealth Patient Portal offered by [facility] by registering at the following website: [email or web address]. By joining [employer]’s FollowMyHealth portal, you will also be able to view your health information using other applications (apps) compatible with our system.

## 2021-11-18 NOTE — ED PROVIDER NOTE - OBJECTIVE STATEMENT
55 y/o F with PMH alcoholic liver cirrhosis presents to ED for abnormal labs. States she was under the care of a hepatologist but recently switched to a new one in [place] Dr Dai, had blood work done Friday and again yesterday and was advised to come to ED as her renal fxn and LFts were high. Patient denies any complaints. States she feels well. No fever chills n/v/d. No abd pain. States she actually has been feeling better because she had a paracentesis on Friday and 12L were removed from abdomen and lost 14 lbs. Denies CP or SOB. Denies yellowing of skin or eyes.     PCP Dr Reeves  Hepatology- Dr Dai ([facility])

## 2021-11-18 NOTE — ED PROVIDER NOTE - EKG ADDITIONAL QUESTION - PERFORMED INDEPENDENT VISUALIZATION
Patient verified by 2 identifiers and allergies reviewed.  22g IV placed to Rt AC.  DSE explained to patient, consent obtained & testing completed.  Pt tolerated testing well. IV removed post testing.  Post study discharge instructions reviewed with patient, patient verbalized understanding.  Patient discharged to home in stable condition.   Yes

## 2021-11-18 NOTE — ED PROVIDER NOTE - PHYSICAL EXAMINATION
PE:   GEN: Awake, alert, interactive, NAD, non-toxic appearing.   HEAD: Atraumatic  EYES: Sclera white, conjunctiva pink, PERRLA   CARDIAC: Reg rate and rhythm, S1,S2, no murmur/rub/gallop. Strong central and peripheral pulses, Brisk cap refill, no evident pedal edema.   RESP: No distress noted. L/S clear = Bilat without accessory muscle use, wheeze, rhonchi, rales.   ABD: ROUND, soft, supple, non-tender, no guarding. BS x 4, normoactive.    NEURO: AOx3, CN II-XII grossly intact without focal deficit.   MSK: Moving all extremities with no apparent deformities.   SKIN: Warm, dry, normal color, without apparent rashes.

## 2021-11-18 NOTE — ED PROVIDER NOTE - CLINICAL SUMMARY MEDICAL DECISION MAKING FREE TEXT BOX
55 y/o f with hepatic cirrhosis secondary to ETOH sent in by hepatologists for abnormal LFTS and renal fxn pt with no complaints recent paracentesis on  11/15 will rpt labs and  renal eval

## 2021-11-27 NOTE — CONSULT NOTE ADULT - SUBJECTIVE AND OBJECTIVE BOX
REASON FOR CONSULT: ***    CONSULT REQUESTED BY: ***    Patient is a 56y old  Female who presents with a chief complaint of     BRIEF HOSPITAL COURSE: ***    Events last 24 hours: ***    PAST MEDICAL & SURGICAL HISTORY:  History of cirrhosis of liver    ETOH abuse      Allergies    latex (Rash)  No Known Drug Allergies    Intolerances      FAMILY HISTORY:      Review of Systems:  CONSTITUTIONAL: No fever, chills, or fatigue  EYES: No eye pain, visual disturbances, or discharge  ENMT:  No difficulty hearing, tinnitus, vertigo; No sinus or throat pain  NECK: No pain or stiffness  RESPIRATORY: No cough, wheezing, chills or hemoptysis; No shortness of breath  CARDIOVASCULAR: No chest pain, palpitations, dizziness, or leg swelling  GASTROINTESTINAL: No abdominal or epigastric pain. No nausea, vomiting, or hematemesis; No diarrhea or constipation. No melena or hematochezia.  GENITOURINARY: No dysuria, frequency, hematuria, or incontinence  NEUROLOGICAL: No headaches, memory loss, loss of strength, numbness, or tremors  SKIN: No itching, burning, rashes, or lesions   MUSCULOSKELETAL: No joint pain or swelling; No muscle, back, or extremity pain  PSYCHIATRIC: No depression, anxiety, mood swings, or difficulty sleeping      Medications:                                  ICU Vital Signs Last 24 Hrs  T(C): 36.9 (27 Nov 2021 17:46), Max: 36.9 (27 Nov 2021 17:46)  T(F): 98.4 (27 Nov 2021 17:46), Max: 98.4 (27 Nov 2021 17:46)  HR: 79 (27 Nov 2021 20:45) (79 - 105)  BP: 101/66 (27 Nov 2021 20:45) (74/47 - 116/81)  BP(mean): --  ABP: --  ABP(mean): --  RR: 18 (27 Nov 2021 20:45) (18 - 22)  SpO2: 99% (27 Nov 2021 20:45) (98% - 99%)    Vital Signs Last 24 Hrs  T(C): 36.9 (27 Nov 2021 17:46), Max: 36.9 (27 Nov 2021 17:46)  T(F): 98.4 (27 Nov 2021 17:46), Max: 98.4 (27 Nov 2021 17:46)  HR: 79 (27 Nov 2021 20:45) (79 - 105)  BP: 101/66 (27 Nov 2021 20:45) (74/47 - 116/81)  BP(mean): --  RR: 18 (27 Nov 2021 20:45) (18 - 22)  SpO2: 99% (27 Nov 2021 20:45) (98% - 99%)    ABG - ( 27 Nov 2021 19:32 )  pH, Arterial: 7.36  pH, Blood: x     /  pCO2: 20    /  pO2: 107   / HCO3: 11    / Base Excess: -14.1 /  SaO2: 99.2                I&O's Detail        LABS:                        10.3   8.17  )-----------( 190      ( 27 Nov 2021 18:56 )             28.8     11-27    132<L>  |  101  |  83<H>  ----------------------------<  93  4.6   |  14<L>  |  4.30<H>    Ca    9.0      27 Nov 2021 18:56    TPro  6.5  /  Alb  1.9<L>  /  TBili  1.3<H>  /  DBili  x   /  AST  93<H>  /  ALT  45  /  AlkPhos  141<H>  11-27      CARDIAC MARKERS ( 27 Nov 2021 18:56 )  x     / x     / 84 U/L / x     / 10.0 ng/mL      CAPILLARY BLOOD GLUCOSE      POCT Blood Glucose.: 85 mg/dL (27 Nov 2021 18:13)    PT/INR - ( 27 Nov 2021 18:56 )   PT: 17.6 sec;   INR: 1.54 ratio         PTT - ( 27 Nov 2021 18:56 )  PTT:38.9 sec    CULTURES:      Physical Examination:    General: No acute distress.  Alert, oriented, interactive, nonfocal    HEENT: Pupils equal, reactive to light.  Symmetric.    PULM: Clear to auscultation bilaterally, no significant sputum production    CVS: Regular rate and rhythm, no murmurs, rubs, or gallops    ABD: Soft, nondistended, nontender, normoactive bowel sounds, no masses    EXT: No edema, nontender    SKIN: Warm and well perfused, no rashes noted.    RADIOLOGY: ***    CRITICAL CARE TIME SPENT: ***   REASON FOR CONSULT: Hypotension, renal failure, HE    CONSULT REQUESTED BY: Dr. Alonso    Patient is a 56y old  Female who presents with a chief complaint of AMS    BRIEF HOSPITAL COURSE:   Patient is a 56 year old female with a pmhx of alcoholic cirrhosis, ETOH abuse who presents with AMS. Per niece at bedside patient was last seen on 11/25 and was slightly more confused at that time. Over past few days patient becoming more altered. Upon arrival found to be hypotensive, lactate 3, bun cr 83/4.3    PAST MEDICAL & SURGICAL HISTORY:  History of cirrhosis of liver    ETOH abuse      Allergies    latex (Rash)  No Known Drug Allergies    Intolerances      FAMILY HISTORY:      Review of Systems:  CONSTITUTIONAL: No fever, chills, or fatigue  EYES: No eye pain, visual disturbances, or discharge  ENMT:  No difficulty hearing, tinnitus, vertigo; No sinus or throat pain  NECK: No pain or stiffness  RESPIRATORY: No cough, wheezing, chills or hemoptysis; No shortness of breath  CARDIOVASCULAR: No chest pain, palpitations, dizziness, or leg swelling  GASTROINTESTINAL: No abdominal or epigastric pain. No nausea, vomiting, or hematemesis; No diarrhea or constipation. No melena or hematochezia.  GENITOURINARY: No dysuria, frequency, hematuria, or incontinence  NEUROLOGICAL: No headaches, memory loss, loss of strength, numbness, or tremors  SKIN: No itching, burning, rashes, or lesions   MUSCULOSKELETAL: No joint pain or swelling; No muscle, back, or extremity pain  PSYCHIATRIC: No depression, anxiety, mood swings, or difficulty sleeping      Medications:                                  ICU Vital Signs Last 24 Hrs  T(C): 36.9 (27 Nov 2021 17:46), Max: 36.9 (27 Nov 2021 17:46)  T(F): 98.4 (27 Nov 2021 17:46), Max: 98.4 (27 Nov 2021 17:46)  HR: 79 (27 Nov 2021 20:45) (79 - 105)  BP: 101/66 (27 Nov 2021 20:45) (74/47 - 116/81)  BP(mean): --  ABP: --  ABP(mean): --  RR: 18 (27 Nov 2021 20:45) (18 - 22)  SpO2: 99% (27 Nov 2021 20:45) (98% - 99%)    Vital Signs Last 24 Hrs  T(C): 36.9 (27 Nov 2021 17:46), Max: 36.9 (27 Nov 2021 17:46)  T(F): 98.4 (27 Nov 2021 17:46), Max: 98.4 (27 Nov 2021 17:46)  HR: 79 (27 Nov 2021 20:45) (79 - 105)  BP: 101/66 (27 Nov 2021 20:45) (74/47 - 116/81)  BP(mean): --  RR: 18 (27 Nov 2021 20:45) (18 - 22)  SpO2: 99% (27 Nov 2021 20:45) (98% - 99%)    ABG - ( 27 Nov 2021 19:32 )  pH, Arterial: 7.36  pH, Blood: x     /  pCO2: 20    /  pO2: 107   / HCO3: 11    / Base Excess: -14.1 /  SaO2: 99.2                I&O's Detail        LABS:                        10.3   8.17  )-----------( 190      ( 27 Nov 2021 18:56 )             28.8     11-27    132<L>  |  101  |  83<H>  ----------------------------<  93  4.6   |  14<L>  |  4.30<H>    Ca    9.0      27 Nov 2021 18:56    TPro  6.5  /  Alb  1.9<L>  /  TBili  1.3<H>  /  DBili  x   /  AST  93<H>  /  ALT  45  /  AlkPhos  141<H>  11-27      CARDIAC MARKERS ( 27 Nov 2021 18:56 )  x     / x     / 84 U/L / x     / 10.0 ng/mL      CAPILLARY BLOOD GLUCOSE      POCT Blood Glucose.: 85 mg/dL (27 Nov 2021 18:13)    PT/INR - ( 27 Nov 2021 18:56 )   PT: 17.6 sec;   INR: 1.54 ratio         PTT - ( 27 Nov 2021 18:56 )  PTT:38.9 sec    CULTURES:      Physical Examination:    General: No acute distress.  Alert, oriented, interactive, nonfocal    HEENT: Pupils equal, reactive to light.  Symmetric.    PULM: Clear to auscultation bilaterally, no significant sputum production    CVS: Regular rate and rhythm, no murmurs, rubs, or gallops    ABD: Soft, nondistended, nontender, normoactive bowel sounds, no masses    EXT: No edema, nontender    SKIN: Warm and well perfused, no rashes noted.    RADIOLOGY: ***    CRITICAL CARE TIME SPENT: ***   REASON FOR CONSULT: Hypotension, renal failure, HE    CONSULT REQUESTED BY: Dr. Alonso    Patient is a 56y old  Female who presents with a chief complaint of AMS    BRIEF HOSPITAL COURSE:   Patient is a 56 year old female with a pmhx of alcoholic cirrhosis, ETOH abuse who presents with AMS. Per niece at bedside patient was last seen on 11/25 and was slightly more confused at that time. Over past few days patient becoming more altered. Upon arrival found to be altered, hypotensive, ammonia 118, lactate 3, bun cr 83/4.3. MICU called for above.     Pt seen and examined at bedside with niece who endorsed medical interview     PAST MEDICAL & SURGICAL HISTORY:  History of cirrhosis of liver    ETOH abuse      Allergies    latex (Rash)  No Known Drug Allergies    Intolerances      FAMILY HISTORY:      Review of Systems:  pt with AMS unable to obtain       Medications:                                  ICU Vital Signs Last 24 Hrs  T(C): 36.9 (27 Nov 2021 17:46), Max: 36.9 (27 Nov 2021 17:46)  T(F): 98.4 (27 Nov 2021 17:46), Max: 98.4 (27 Nov 2021 17:46)  HR: 79 (27 Nov 2021 20:45) (79 - 105)  BP: 101/66 (27 Nov 2021 20:45) (74/47 - 116/81)  BP(mean): --  ABP: --  ABP(mean): --  RR: 18 (27 Nov 2021 20:45) (18 - 22)  SpO2: 99% (27 Nov 2021 20:45) (98% - 99%)    Vital Signs Last 24 Hrs  T(C): 36.9 (27 Nov 2021 17:46), Max: 36.9 (27 Nov 2021 17:46)  T(F): 98.4 (27 Nov 2021 17:46), Max: 98.4 (27 Nov 2021 17:46)  HR: 79 (27 Nov 2021 20:45) (79 - 105)  BP: 101/66 (27 Nov 2021 20:45) (74/47 - 116/81)  BP(mean): --  RR: 18 (27 Nov 2021 20:45) (18 - 22)  SpO2: 99% (27 Nov 2021 20:45) (98% - 99%)    ABG - ( 27 Nov 2021 19:32 )  pH, Arterial: 7.36  pH, Blood: x     /  pCO2: 20    /  pO2: 107   / HCO3: 11    / Base Excess: -14.1 /  SaO2: 99.2                I&O's Detail        LABS:                        10.3   8.17  )-----------( 190      ( 27 Nov 2021 18:56 )             28.8     11-27    132<L>  |  101  |  83<H>  ----------------------------<  93  4.6   |  14<L>  |  4.30<H>    Ca    9.0      27 Nov 2021 18:56    TPro  6.5  /  Alb  1.9<L>  /  TBili  1.3<H>  /  DBili  x   /  AST  93<H>  /  ALT  45  /  AlkPhos  141<H>  11-27      CARDIAC MARKERS ( 27 Nov 2021 18:56 )  x     / x     / 84 U/L / x     / 10.0 ng/mL      CAPILLARY BLOOD GLUCOSE      POCT Blood Glucose.: 85 mg/dL (27 Nov 2021 18:13)    PT/INR - ( 27 Nov 2021 18:56 )   PT: 17.6 sec;   INR: 1.54 ratio         PTT - ( 27 Nov 2021 18:56 )  PTT:38.9 sec    CULTURES:      Physical Examination:    General: Adult female, mild distress, in bed     HEENT: Pupils equal, reactive to light.  Symmetric.    PULM: Clear to auscultation bilaterally, no significant sputum production    CVS: +s1/s2    ABD: mildly tender, distended, normoactive bowel sounds, no masses    EXT: trace LE edema, nontender    SKIN: Warm and well perfused,    Neuro: alert and oriented x1, able to follow commands     RADIOLOGY:   < from: CT Abdomen and Pelvis No Cont (11.27.21 @ 19:15) >  INTERPRETATION:  CLINICAL INFORMATION: Cirrhosis. Evaluate for infection.    COMPARISON: None.    CONTRAST/COMPLICATIONS:  IV Contrast: NONE  0 cc administered   0 cc discarded  Oral Contrast: NONE  Complications: None reported at time of study completion    PROCEDURE:  CT of the Abdomen and Pelvis was performed.  Sagittal and coronal reformats were performed.    FINDINGS:    LOWER CHEST: Main pulmonaryartery is borderline enlarged which may reflect pulmonary arterial hypertension. Nonspecific geographic groundglass of the visualized lung parenchyma and dependent subsegmental atelectasis. Pulmonary congestion versus other atypical infection can be considered. No pleural effusion of the visualized thorax. Thin-walled lung cyst versus focal emphysema of the right upper lobe, image 9 series 3.    Solid organ evaluation is limited due to noncontrast technique.    LIVER: Cirrhotic liver. Correlate with AFP level and MRI abdomen for HCC screening.  BILE DUCTS: No biliary distention.  GALLBLADDER: Gallbladder is not visualized due to significant surrounding ascites and streak artifact. Correlate with surgical history.  SPLEEN: Normal size. Calcified granuloma.  PANCREAS: No main pancreatic ductal dilatation.  ADRENALS: Unremarkable.  KIDNEYS/URETERS: No hydronephrosis.    BLADDER: Underdistended.  REPRODUCTIVE ORGANS: Uterus and adnexa suboptimally characterized on CT.    BOWEL: Limited evaluation due to lack of oral contrast. The stomach is underdistended. No small bowel distention. Appendix appears nondistended, suboptimally assessed. Mild right-sided and transverse colonic wall thickening versus portal colopathy. Correlate with clinicalsymptoms.  PERITONEUM: Large ascites. Mild mesenteric haziness.  VESSELS: No aneurysm of the abdominal aorta.  RETROPERITONEUM/LYMPH NODES: Small volume nodes of the upper abdomen and pelvis.  ABDOMINAL WALL: Soft tissue edema.  BONES: Multilevel degenerative change of the visualized bones. Please note that central canal and neural foramina are not adequately assessed on this study.    IMPRESSION:    Limited noncontrast study.    Liver cirrhosis. Large ascites.    Colitis versus portal colopathy as described above. Correlate with clinical symptoms.    Nonspecific geographic groundglass of the visualized lung parenchyma and dependent subsegmental atelectasis.  Pulmonary congestion versus other atypical infection can be considered. No pleural effusion of the visualized thorax.    Additional findings as above.           REASON FOR CONSULT: Hypotension, renal failure, HE    CONSULT REQUESTED BY: Dr. Alonso    Patient is a 56y old  Female who presents with a chief complaint of AMS    BRIEF HOSPITAL COURSE:   Patient is a 56 year old female with a pmhx of alcoholic cirrhosis, ETOH abuse who presents with AMS. Per niece at bedside patient was last seen on 11/25 and was slightly more confused at that time. Over past few days patient becoming more altered. Upon arrival found to be altered, hypotensive, ammonia 118, lactate 3, bun cr 83/4.3. MICU called for above.     Pt seen and examined at bedside with niece who endorsed medical interview     PAST MEDICAL & SURGICAL HISTORY:  History of cirrhosis of liver    ETOH abuse      Allergies    latex (Rash)  No Known Drug Allergies    Intolerances      FAMILY HISTORY:      Review of Systems:  pt with AMS unable to obtain       Medications:                                  ICU Vital Signs Last 24 Hrs  T(C): 36.9 (27 Nov 2021 17:46), Max: 36.9 (27 Nov 2021 17:46)  T(F): 98.4 (27 Nov 2021 17:46), Max: 98.4 (27 Nov 2021 17:46)  HR: 79 (27 Nov 2021 20:45) (79 - 105)  BP: 101/66 (27 Nov 2021 20:45) (74/47 - 116/81)  BP(mean): --  ABP: --  ABP(mean): --  RR: 18 (27 Nov 2021 20:45) (18 - 22)  SpO2: 99% (27 Nov 2021 20:45) (98% - 99%)    Vital Signs Last 24 Hrs  T(C): 36.9 (27 Nov 2021 17:46), Max: 36.9 (27 Nov 2021 17:46)  T(F): 98.4 (27 Nov 2021 17:46), Max: 98.4 (27 Nov 2021 17:46)  HR: 79 (27 Nov 2021 20:45) (79 - 105)  BP: 101/66 (27 Nov 2021 20:45) (74/47 - 116/81)  BP(mean): --  RR: 18 (27 Nov 2021 20:45) (18 - 22)  SpO2: 99% (27 Nov 2021 20:45) (98% - 99%)    ABG - ( 27 Nov 2021 19:32 )  pH, Arterial: 7.36  pH, Blood: x     /  pCO2: 20    /  pO2: 107   / HCO3: 11    / Base Excess: -14.1 /  SaO2: 99.2                I&O's Detail        LABS:                        10.3   8.17  )-----------( 190      ( 27 Nov 2021 18:56 )             28.8     11-27    132<L>  |  101  |  83<H>  ----------------------------<  93  4.6   |  14<L>  |  4.30<H>    Ca    9.0      27 Nov 2021 18:56    TPro  6.5  /  Alb  1.9<L>  /  TBili  1.3<H>  /  DBili  x   /  AST  93<H>  /  ALT  45  /  AlkPhos  141<H>  11-27      CARDIAC MARKERS ( 27 Nov 2021 18:56 )  x     / x     / 84 U/L / x     / 10.0 ng/mL      CAPILLARY BLOOD GLUCOSE      POCT Blood Glucose.: 85 mg/dL (27 Nov 2021 18:13)    PT/INR - ( 27 Nov 2021 18:56 )   PT: 17.6 sec;   INR: 1.54 ratio         PTT - ( 27 Nov 2021 18:56 )  PTT:38.9 sec    CULTURES:      Physical Examination:    General: Adult female, mild distress, in bed     HEENT: Pupils equal, reactive to light.  Symmetric.    PULM: Clear to auscultation bilaterally, no significant sputum production    CVS: +s1/s2    ABD: mildly tender, distended, normoactive bowel sounds, no masses    EXT: trace LE edema, nontender    SKIN: Warm and well perfused,    Neuro: alert and oriented x1, able to follow commands     RADIOLOGY:   < from: CT Abdomen and Pelvis No Cont (11.27.21 @ 19:15) >  INTERPRETATION:  CLINICAL INFORMATION: Cirrhosis. Evaluate for infection.    COMPARISON: None.    CONTRAST/COMPLICATIONS:  IV Contrast: NONE  0 cc administered   0 cc discarded  Oral Contrast: NONE  Complications: None reported at time of study completion    PROCEDURE:  CT of the Abdomen and Pelvis was performed.  Sagittal and coronal reformats were performed.    FINDINGS:    LOWER CHEST: Main pulmonaryartery is borderline enlarged which may reflect pulmonary arterial hypertension. Nonspecific geographic groundglass of the visualized lung parenchyma and dependent subsegmental atelectasis. Pulmonary congestion versus other atypical infection can be considered. No pleural effusion of the visualized thorax. Thin-walled lung cyst versus focal emphysema of the right upper lobe, image 9 series 3.    Solid organ evaluation is limited due to noncontrast technique.    LIVER: Cirrhotic liver. Correlate with AFP level and MRI abdomen for HCC screening.  BILE DUCTS: No biliary distention.  GALLBLADDER: Gallbladder is not visualized due to significant surrounding ascites and streak artifact. Correlate with surgical history.  SPLEEN: Normal size. Calcified granuloma.  PANCREAS: No main pancreatic ductal dilatation.  ADRENALS: Unremarkable.  KIDNEYS/URETERS: No hydronephrosis.    BLADDER: Underdistended.  REPRODUCTIVE ORGANS: Uterus and adnexa suboptimally characterized on CT.    BOWEL: Limited evaluation due to lack of oral contrast. The stomach is underdistended. No small bowel distention. Appendix appears nondistended, suboptimally assessed. Mild right-sided and transverse colonic wall thickening versus portal colopathy. Correlate with clinicalsymptoms.  PERITONEUM: Large ascites. Mild mesenteric haziness.  VESSELS: No aneurysm of the abdominal aorta.  RETROPERITONEUM/LYMPH NODES: Small volume nodes of the upper abdomen and pelvis.  ABDOMINAL WALL: Soft tissue edema.  BONES: Multilevel degenerative change of the visualized bones. Please note that central canal and neural foramina are not adequately assessed on this study.    IMPRESSION:    Limited noncontrast study.    Liver cirrhosis. Large ascites.    Colitis versus portal colopathy as described above. Correlate with clinical symptoms.    Nonspecific geographic groundglass of the visualized lung parenchyma and dependent subsegmental atelectasis.  Pulmonary congestion versus other atypical infection can be considered. No pleural effusion of the visualized thorax.    Additional findings as above.      Critical Care time: 35 mins assessing presenting problems of acute illness that poses high probability of life threatening deterioration or end organ damage/dysfunction.  Medical decision making inculding Initiating plan of care, reviewing data, reviewing radiology,direct patient bedside evaluation and interpretation of vital signs, any necessary ventilator management , discusion with multidisciplinary team, discussing goals of care with patient/family, all non inclusive of procedures

## 2021-11-27 NOTE — H&P ADULT - PROBLEM SELECTOR PLAN 6
SubQ Heparin and SCDs Chronic  -Hold home meds given hypotension Hgb 10.3, Hct 28.8 on admission  Likely 2/2 liver pathology  F/u AM labs  Transfuse if Hgb <7

## 2021-11-27 NOTE — H&P ADULT - ASSESSMENT
55yo F with PMH of liver cirrhosis BIB niece for worsening altered mental status and dyspnea over the last 3 days. Pt was a heavy drinker 10+ years ago and since then has denied having alcohol, though family is unsure. Pt was tapped 2 weeks ago. Admitted for AMS and ARF.    ED Course:  Significant labs: Hgb 10.3, Hct 28.8, Ammonia 118, Lactate 3.0, Na 132, BUN 83, Cr 4.3, Albumin 1.9, Tbili 1.3, AST/ALT 93/14, Troponin 268, CKMB 10, CPK 11.9  ABG significant for: pCO2 20, bicarb 11,   Given: 500mg Levofloxacin x1, 500mL NS bolus, 30g Lactulose x1  EKG: NSR  CXR (read by me): decreased lung volumes b/l, left sided   CT head: Cerebral volume loss more than expected for patient's age.  Patchy cerebral white matter hypodensities, nonspecific, although likely on the basis of chronic microangiopathy.  CT abd/pelv: liver cirrhosis, large ascities. Colitis vs portal colopathy. Groundglass and dependent subsegmental atelectasis of lungs. No pleural effusion.    57yo F with PMH of liver cirrhosis BIB niece for worsening altered mental status and dyspnea over the last 3 days. Pt was a heavy drinker 10+ years ago and since then has denied having alcohol, though family is unsure. Pt was tapped 2 weeks ago. Admitted for AMS and ARF.   57yo F with PMH of liver cirrhosis and HTN BIB niece for worsening altered mental status and dyspnea over the last 3 days. Pt was a heavy drinker 10+ years ago and since then has denied having alcohol, though family is unsure. Pt was tapped 2 weeks ago. Admitted for AMS and ARF.

## 2021-11-27 NOTE — H&P ADULT - NSHPREVIEWOFSYSTEMS_GEN_ALL_CORE
Limited meaningful ROS 2/2 pt's clinical status   CONSTITUTIONAL: admits to weakness, denies fever, chills, fatigue  HEENT: denies cough, sore throat  CARDIOVASCULAR: denies chest pain, palpitations  RESPIRATORY: admits to shortness of breath, denies wheezing   GASTROINTESTINAL: admits to bloating and slight abdominal pain, denies nausea, vomiting, constipation, diarrhea  NEUROLOGICAL: denies numbness, headache  MUSCULOSKELETAL: denies new joint pain, muscle aches  HEMATOLOGIC: denies gross bleeding Limited meaningful ROS 2/2 pt's clinical status     CONSTITUTIONAL: admits to weakness, denies fever, chills, fatigue  HEENT: denies cough, sore throat  CARDIOVASCULAR: denies chest pain, palpitations  RESPIRATORY: admits to shortness of breath, denies wheezing   GASTROINTESTINAL: admits to bloating and slight abdominal pain, denies nausea, vomiting, constipation, diarrhea  NEUROLOGICAL: denies numbness, headache  MUSCULOSKELETAL: denies new joint pain, muscle aches  HEMATOLOGIC: denies gross bleeding

## 2021-11-27 NOTE — ED ADULT NURSE NOTE - OBJECTIVE STATEMENT
Patient A/Ox1 (self). Daughter at bedside. She says the patient has had an onset of AMS today. Patient is restless in bed and groaning. Hx of liver cirrhosis with a tap done 2 weeks ago. Abdomen distended on exam. Telemetry monitor on. EKG, IV and labs done. Call light in reach. Patient tachypneic on exam. 2L NC applied for comfort. Urine cup at bedside, awaiting sample. Patient A/Ox1 (self). Daughter at bedside. She says the patient has had an onset of AMS today. Patient is restless in bed and groaning. Hx of liver cirrhosis with a tap done 2 weeks ago. Abdomen distended on exam. Telemetry monitor on. EKG, IV and labs done. Call light in reach. Patient tachypneic on exam. 2L NC applied for comfort. Urine cup at bedside, awaiting sample. Respiratory contacted to perform ABG.

## 2021-11-27 NOTE — H&P ADULT - ATTENDING COMMENTS
55yo F with PMH of liver cirrhosis and HTN BIB niece for worsening altered mental status and dyspnea over the last 3 days admitted with hepatic encephalopathy and VIDHI on CKD. Found to be hypotensive - shock. Concern for sepsis - blood/urine cultures - empiric antimicrobial coverage. Admit to MICU. Plan for paracentesis to rule out SBP and alleviate discomfort. Patient is likely dyspneic from high volume ascites. Diuretics on hold due to VIDHI and hypotension. Discussed with patient at bedside and ICU FAY Colbert.    Agree with H&P as outlined above, edited where appropriate.

## 2021-11-27 NOTE — H&P ADULT - PROBLEM SELECTOR PLAN 2
Troponin 268, CKMB 10, CPK 11.9 on admission, likely 2/2 demand  F/u repeat Yvonne  Possibly order TTE  Medical management as per MICU recommendations patient with known cirrhosis with encephalopathy with elevated ammonia  would consider rifaximin and lactulose

## 2021-11-27 NOTE — PROVIDER CONTACT NOTE (EICU) - ASSESSMENT
Problem List  1. Extremely low albumin with acute on chronic kidney insufficiency concerned of nephrotic syndrome.  This is NOT hepatorenal syndrome.    2. Third spacing from extreme hypoalbuminemia causing massive ascites  3. Hypotension likely from intravascular depletion from extreme hypoalbuminemia.  A bit of hypovolemia would cause hypotension  4. ETOH cirrhosis, unclear if she has portal hypertension

## 2021-11-27 NOTE — H&P ADULT - HISTORY OF PRESENT ILLNESS
55yo F with PMH of liver cirrhosis BIB niece for altered mental status and dyspnea. Pt was in bed and the niece noticed she had increased work of breathing, increased abdominal distention, and seemed more altered compared to 3 days ago, when she last saw her. The pt told her niece that her last meal might have been some time yesterday, but was unsure. Of note, the pt was a heavy drinker 10+ years ago and since then has denied having alcohol. Pt was tapped 2 weeks ago. On exam, pt was A&O2-3, complaining of abdominal distention and discomfort, and dyspnea, but denied fever/chills, chest pain, n/v/c/d.    ED Course:  Significant labs: Hgb 10.3, Hct 28.8, Ammonia 118, Lactate 3.0, Na 132, BUN 83, Cr 4.3, Albumin 1.9, Tbili 1.3, AST/ALT 93/14, Troponin 268, CKMB 10, CPK 11.9  ABG significant for: pCO2 20, bicarb 11,   Given: 500mg Levofloxacin x1, 500mL NS bolus, 30g Lactulose x1  EKG: NSR  CXR (read by me): decreased lung volumes b/l, left sided   CT head: Cerebral volume loss more than expected for patient's age.  Patchy cerebral white matter hypodensities, nonspecific, although likely on the basis of chronic microangiopathy.  CT abd/pelv: liver cirrhosis, large ascities. Colitis vs portal colopathy. Groundglass and dependent subsegmental atelectasis of lungs. No pleural effusion.  55yo F with PMH of liver cirrhosis BIB niece for altered mental status and dyspnea. Pt was in bed and the niece noticed she had increased work of breathing, increased abdominal distention, and seemed more altered compared to 3 days ago, when she last saw her. The pt told her niece that her last meal might have been some time yesterday, but was unsure. Of note, the pt was a heavy drinker 10+ years ago and since then has denied having alcohol. Pt was tapped 2 weeks ago. On exam, pt was A&O2-3, complaining of abdominal distention and discomfort, and dyspnea, but denied fever/chills, chest pain, n/v/c/d.    ED Course:  Vital Signs: T 98.4F   BP 74/47      RR 22   O2 99% on RA  Significant labs: Hgb 10.3, Hct 28.8, Ammonia 118, Lactate 3.0, Na 132, BUN 83, Cr 4.3, Albumin 1.9, Tbili 1.3, AST/ALT 93/14, Troponin 268, CKMB 10, CPK 11.9  ABG significant for: pCO2 20, bicarb 11  Given: 500mg Levofloxacin x1, 500mL NS bolus x2, 30g Lactulose x1  EKG: NSR  CXR (read by me): decreased lung volumes b/l, left sided   CT head: Cerebral volume loss more than expected for patient's age.  Patchy cerebral white matter hypodensities, nonspecific, although likely on the basis of chronic microangiopathy.  CT abd/pelv: liver cirrhosis, large ascites Colitis vs portal colopathy. Groundglass and dependent subsegmental atelectasis of lungs. No pleural effusion.  55yo F with PMH of liver cirrhosis and HTN BIB niece for altered mental status and dyspnea. Pt was in bed and the niece noticed she had increased work of breathing, increased abdominal distention, and seemed more altered compared to 3 days ago, when she last saw her. The pt told her niece that her last meal might have been some time yesterday, but was unsure. Of note, the pt was a heavy drinker 10+ years ago and since then has denied having alcohol. Pt was tapped 2 weeks ago. On exam, pt was A&O2-3, complaining of abdominal distention and discomfort, and dyspnea, but denied fever/chills, chest pain, n/v/c/d.    ED Course:  Vital Signs: T 98.4F   BP 74/47      RR 22   O2 99% on RA  Significant labs: Hgb 10.3, Hct 28.8, Ammonia 118, Lactate 3.0, Na 132, BUN 83, Cr 4.3, Albumin 1.9, Tbili 1.3, AST/ALT 93/14, Troponin 268, CKMB 10, CPK 11.9  ABG significant for: pCO2 20, bicarb 11  Given: 500mg Levofloxacin x1, 500mL NS bolus x2, 30g Lactulose x1  EKG: NSR  CXR (read by me): decreased lung volumes b/l, left sided   CT head: Cerebral volume loss more than expected for patient's age.  Patchy cerebral white matter hypodensities, nonspecific, although likely on the basis of chronic microangiopathy.  CT abd/pelv: liver cirrhosis, large ascites Colitis vs portal colopathy. Groundglass and dependent subsegmental atelectasis of lungs. No pleural effusion.  57 y/o F with PMH of liver cirrhosis and HTN BIB niece for altered mental status and dyspnea. Pt was in bed and the niece noticed she had increased work of breathing, increased abdominal distention, and seemed more altered compared to 3 days ago, when she last saw her. The pt told her niece that her last meal might have been some time yesterday, but was unsure. Of note, the pt was a heavy drinker 10+ years ago and since then has denied having alcohol. Pt was tapped 2 weeks ago. On exam, pt was A&O2-3, complaining of abdominal distention and discomfort, and dyspnea, but denied fever/chills, chest pain, n/v/c/d.    ED Course:  Vital Signs: T 98.4F   BP 74/47      RR 22   O2 99% on RA  Significant labs: Hgb 10.3, Hct 28.8, Ammonia 118, Lactate 3.0, Na 132, BUN 83, Cr 4.3, Albumin 1.9, Tbili 1.3, AST/ALT 93/14, Troponin 268, CKMB 10, CPK 11.9  ABG significant for: pCO2 20, bicarb 11  Given: 500mg Levofloxacin x1, 500mL NS bolus x2, 30g Lactulose x1  EKG: NSR  CXR (read by me): decreased lung volumes b/l, left sided   CT head: Cerebral volume loss more than expected for patient's age. Patchy cerebral white matter hypodensities, nonspecific, although likely on the basis of chronic microangiopathy.  CT abd/pelv: liver cirrhosis, large ascites Colitis vs portal colopathy. Groundglass and dependent subsegmental atelectasis of lungs. No pleural effusion.

## 2021-11-27 NOTE — H&P ADULT - PROBLEM SELECTOR PLAN 3
Cr 4.3 on admission with Cr @1.5 on 11/18, 2/2 pre-renal vs shock vs hepatorenal   S/p 500mL NS bolus x2, gave 1L LR bolus  Monitor with routine CMP  Strict Is&Os, patricia inserted   F/u UA, SAKINA, AFP  Medical management as per MICU recommendations Chronic   Ammonia elevated @ 118 with elevated LFTs  CT abd/pelv: liver cirrhosis, large ascites Colitis vs portal colopathy. Groundglass and dependent subsegmental atelectasis of lungs. No pleural effusion.   Hold home lasix and spironolactone given hypotension - fluid removal w/ paracentesis  R/o SBP, perform tap, f/u cultures and fluid analysis  On empiric antibiotics

## 2021-11-27 NOTE — ED PROVIDER NOTE - CLINICAL SUMMARY MEDICAL DECISION MAKING FREE TEXT BOX
pt with ams, hx cirrhosis,  check labs, ct head, abd, bp improved with small fluids, pt third spacing so limit fluids, ice consult. pt need renal consult will be called by icu, whitney for ? pn vs sbp.  admit icu

## 2021-11-27 NOTE — H&P ADULT - PROBLEM SELECTOR PLAN 1
Meets SIRS criteria with RR of 22 and HR of 105  Admit to MICU, r/o SBP  CT abd/pelvis: liver cirrhosis, large ascities. Colitis vs portal colopathy. Groundglass and dependent subsegmental atelectasis of lungs. No pleural effusion.   S/p Levofloxacin x1, 500mL NS bolus x2  Started IV Rocephin, Given 1L LR bolus   Serum Lactate 3.0, f/u repeat lactate   F/u blood and urine cultures  F/u UA  Medical management as per MICU recommendations VIDHI on CKD  Cr 4.3 on admission with Cr @1.5 on 11/18, 2/2 pre-renal vs shock vs nephropathy  metabolic acidosis noted on ABG  S/p 500mL NS bolus x2, gave 1L LR bolus  Monitor with routine CMP  Strict Is&Os, patricia inserted   F/u UA, SAKINA, AFP  Nephrology consult per MICU

## 2021-11-27 NOTE — ED ADULT TRIAGE NOTE - CHIEF COMPLAINT QUOTE
Patient is a 55 yo female complaining of shortness of breath alter mental status dehydration. Patient family does not know last well known Patient last thoracentesis was 2 weeks ago Patient has history of liver cirrhosis

## 2021-11-27 NOTE — H&P ADULT - NSHPPHYSICALEXAM_GEN_ALL_CORE
T(C): 36.9 (11-27-21 @ 17:46), Max: 36.9 (11-27-21 @ 17:46)  HR: 79 (11-27-21 @ 20:45) (79 - 105)  BP: 101/66 (11-27-21 @ 20:45) (74/47 - 116/81)  RR: 18 (11-27-21 @ 20:45) (18 - 22)  SpO2: 99% (11-27-21 @ 20:45) (98% - 99%)    GENERAL: patient appears anxious, in slight distress, trying to reposition herself multiple times   EYES: sclera clear, no exudates  ENMT: unable to assess   NECK: supple, soft, no thyromegaly noted  LUNGS: decreased breath sounds R>L, no wheezing or rhonchi appreciated   HEART: soft S1/S2, regular rate and rhythm, no murmurs noted, no lower extremity edema  GASTROINTESTINAL: abdomen is distended, decreased bowel sounds appreciated, nontender  INTEGUMENT: warm and dry   MUSCULOSKELETAL: no clubbing or cyanosis, no obvious deformity  NEUROLOGIC: A&Ox2-3 (to self and time, able to state at a hospital but cannot name the hospital), moving all extremities, no obvious sensory deficits  HEME/LYMPH: no palpable supraclavicular nodules, no obvious ecchymosis or petechiae

## 2021-11-27 NOTE — H&P ADULT - PROBLEM SELECTOR PLAN 5
Chronic   Ammonia elevated @ 118 with elevated LFTs  CT abd/pelv: liver cirrhosis, large ascites Colitis vs portal colopathy. Groundglass and dependent subsegmental atelectasis of lungs. No pleural effusion.   R/o SBP, perform tap Chronic   Ammonia elevated @ 118 with elevated LFTs  CT abd/pelv: liver cirrhosis, large ascites Colitis vs portal colopathy. Groundglass and dependent subsegmental atelectasis of lungs. No pleural effusion.   Cont home lasix and spironolactone   R/o SBP, perform tap Troponin 268, CKMB 10, CPK 11.9 on admission, likely 2/2 demand ischemia from shock and renal insufficiency  F/u repeat Yvonne  Consider TTE and cardio consult

## 2021-11-27 NOTE — H&P ADULT - NSHPSOCIALHISTORY_GEN_ALL_CORE
Tobacco: unknown if former or current (pt said she's a former smoker, niece stated she sweeped her apt and found cigarettes)  EtOH: heavy drinker in the past, unknown if currently drinks alcohol  recreational drug use: denies  Lives with: daughter   Ambulates: without assistance   ADLs: without assistance   Vaccinations: Pfizer 2nd vaccine in 4/2021 Tobacco: unknown if former or current (pt said she's a former smoker, niece stated she sweeped her apt and found cigarettes)  EtOH: heavy drinker in the past, unknown if currently drinks alcohol (states has not drank in years)  recreational drug use: denies  Lives with: daughter   Ambulates: without assistance   ADLs: without assistance   Vaccinations: Pfizer 2nd vaccine in 4/2021

## 2021-11-27 NOTE — H&P ADULT - NSICDXFAMILYHX_GEN_ALL_CORE_FT
FAMILY HISTORY:  Mother  Still living? Yes, Estimated age: Age Unknown  Family history of Hodgkin's lymphoma, Age at diagnosis: Age Unknown

## 2021-11-27 NOTE — CONSULT NOTE ADULT - ASSESSMENT
Patient is a 56 year old female with a pmhx of alcoholic cirrhosis, ETOH abuse who presents with AMS, found to have:    1. Hepatic encephalopathy  2. AMS  3. Acidosis  4. ARF   5. r/o SBP    6. Ascities   7. cirrhosis    Plan  Neuro: AMS in setting of HE. Ammonia level is 118. Will start lactulose 30g q6  Cardio: Hypotension in setting of dehydration and low albumin levels causing significant third spacing and ascites. Will bolus with 2L of fluid now. May need albumin as well. Trend trop, suspect elevated 2/2 demand ischemia. POCUS at bedside shows hyperdynamic LV. Will order official TTE  Pulm: Tachypnea and SOB likely as a result of large volume ascites pushing up diaphragm as well as metabolic acidosis. ABG show resp compensation She stable on NC  GI: NPO except meds. Large volume ascites seen on ct abd/pelvis. Plan for paracentesis  when able.   Renal: Place patricia, strict I/Os, renally dose meds. Ct without obstruction. Send UA, SAKINA, AFP. Renal biopsy at some point? Will get renal consult to help guide management   ID: will cover with Rocephin for SBP as possible source until ruled out and blood cultures come back. Trend lactate, procal  Heme: sq heparin + SCDs for dvt ppx   Endo: no active issues     dispo: Admit to MICU, case d/w eICU  Patient is a 56 year old female with a pmhx of alcoholic cirrhosis, ETOH abuse who presents with AMS, found to have:    1. Hepatic encephalopathy  2. AMS  3. Acidosis  4. ARF   5. r/o SBP    6. Ascities   7. cirrhosis    Plan  Neuro: AMS in setting of HE. Ammonia level is 118. Will start lactulose 30g q6  Cardio: Hypotension in setting of dehydration and low albumin levels causing significant third spacing and ascites. Will bolus with 2L of fluid now. May need albumin as well. Trend trop, suspect elevated 2/2 demand ischemia. POCUS at bedside shows hyperdynamic LV. Will order official TTE  Pulm: Tachypnea and SOB likely as a result of large volume ascites pushing up diaphragm as well as metabolic acidosis. ABG show resp compensation She stable on NC  GI: NPO except meds. Large volume ascites seen on ct abd/pelvis. Plan for paracentesis  when able.   Renal: Brisk rise in cr over 10 days from 1.5-->4.3, etiology could be 2/2 dehydration and intravascular volume depletion, doubtful hepto-renal given bilirubin 1.3 . Place patricia, strict I/Os, renally dose meds. Ct without obstruction. Send UA, SAKINA, AFP. Renal biopsy at some point? Will get renal consult to help guide management   ID: will cover with Rocephin for SBP as possible source until ruled out and blood cultures come back. Trend lactate, procal  Heme: sq heparin + SCDs for dvt ppx   Endo: no active issues     dispo: Admit to MICU, case d/w eICU

## 2021-11-27 NOTE — PROVIDER CONTACT NOTE (EICU) - BACKGROUND
56F with a history of ETOH liver cirrhosis admitted 2 weeks ago s/p large volume paracentesis came in for dehydration and change of mental status.  SBP in 70s in ED, responded to fluid boluses.  Labs was most remarkable for Albumin of 1.7, TP 6.5, Cr 4.3 from 1.5 2 weeks ago.  MELD from 20 to 28, solely because of VIDHI, Tbili actually came down from 1.7 to 1.3.  Plt 190. no splenomegaly.  CCM POCUS showed hyperdynamic heart

## 2021-11-27 NOTE — ED PROVIDER NOTE - OBJECTIVE STATEMENT
pt per sanket, is 55 yo female hx etoh abuse, smoker, cirrhosis. pt with progressive confusion, abd ascites and sob since thursday.  no n/v/d. no cough, no other hx obtained    pmd: dr ritchie

## 2021-11-27 NOTE — ED PROVIDER NOTE - CONSTITUTIONAL, MLM
normal... ill appearing, confused, alert, oriented to person,, confused and in midl apparent distress.

## 2021-11-27 NOTE — H&P ADULT - PROBLEM SELECTOR PLAN 4
Hgb 10.3, Hct 28.8 on admission  Likely 2/2 liver pathology  F/u AM labs  Transfuse if Hgb <7 Meets SIRS criteria with RR of 22 and HR of 105  CT abd/pelvis: liver cirrhosis, large ascites. Colitis vs portal colopathy. Groundglass and dependent subsegmental atelectasis of lungs. No pleural effusion.   S/p Levofloxacin x1, 500mL NS bolus x2  Started IV Rocephin, Given 1L LR bolus  Diuretics on hold for now due to hypotension/possible sepsis  Serum Lactate 3.0, f/u repeat lactate   F/u blood and urine cultures  F/u UA  Medical management as per MICU recommendations

## 2021-11-27 NOTE — PROVIDER CONTACT NOTE (EICU) - RECOMMENDATIONS
Plan  1. She needs a renal consult, and in my opinion, she needs an urgent renal biopsy to find out what kind of nephrotic syndrome  2. Unclear if she has an infection, please send procal, and agree to cover with cipro/2nd gen of cephalosporin, pan culture  3. Check UA, and she needs 24 hours proteinuria collection  4. Check SAKINA, AFP

## 2021-11-28 PROBLEM — F10.10 ALCOHOL ABUSE, UNCOMPLICATED: Chronic | Status: ACTIVE | Noted: 2021-01-01

## 2021-11-28 PROBLEM — Z87.19 PERSONAL HISTORY OF OTHER DISEASES OF THE DIGESTIVE SYSTEM: Chronic | Status: ACTIVE | Noted: 2021-01-01

## 2021-11-28 NOTE — PROGRESS NOTE ADULT - PROBLEM SELECTOR PLAN 2
patient with known cirrhosis with encephalopathy with elevated ammonia  would consider rifaximin and lactulose patient with known cirrhosis with encephalopathy with elevated ammonia  would consider rifaximin and lactulose  s/p paracentesis today

## 2021-11-28 NOTE — PROGRESS NOTE ADULT - PROBLEM SELECTOR PLAN 1
VIDHI on CKD  Cr 4.3 on admission with Cr @1.5 on 11/18, 2/2 pre-renal vs shock vs nephropathy  metabolic acidosis noted on ABG  S/p 500mL NS bolus x2, gave 1L LR bolus  Monitor with routine CMP  Strict Is&Os, patricia inserted   F/u UA, SAKINA, AFP  Nephrology consult per MICU VIDHI on CKD  Cr 4.3 on admission with Cr @1.5 on 11/18, 2/2 pre-renal vs shock vs nephropathy  metabolic acidosis noted on ABG  S/p 500mL NS bolus x2, gave 1L LR bolus  Monitor with routine CMP  Strict Is&Os, patricia inserted   F/u UA, SAKINA, AFP  Nephrology consult per MICU, as per Ismael, add midodrine
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
## 2021-11-28 NOTE — DIETITIAN INITIAL EVALUATION ADULT. - PROBLEM SELECTOR PLAN 4
Meets SIRS criteria with RR of 22 and HR of 105  CT abd/pelvis: liver cirrhosis, large ascites. Colitis vs portal colopathy. Groundglass and dependent subsegmental atelectasis of lungs. No pleural effusion.   S/p Levofloxacin x1, 500mL NS bolus x2  Started IV Rocephin, Given 1L LR bolus  Diuretics on hold for now due to hypotension/possible sepsis  Serum Lactate 3.0, f/u repeat lactate   F/u blood and urine cultures  F/u UA  Medical management as per MICU recommendations

## 2021-11-28 NOTE — CONSULT NOTE ADULT - ASSESSMENT
VIDHI: Likely multifactorial: Prerenal azotemia, R/o HRS, ? Tamponade effect, Hemodynamic ATN  h/o Alcoholic cirrhosis  Hyponatremia  Metabolic acidosis  Anemia  Hepatic encephalopathy  Ascites  Hypotension    For paracentesis with albumin. Check urine sodium. Continue IV hydration. On Octreotide and albumin. Add midodrine. Rx for hepatic encephalopathy.  Avoid nephrotoxic meds as possible. Avoid ACEI, ARB, NSAIDs and IV contrast. Will follow electrolytes and renal function trend.   Monitor h/h trend. Will follow electrolytes and renal function trend. Further recommendations pending clinical course. Thank you for the courtesy of this referral.

## 2021-11-28 NOTE — DIETITIAN INITIAL EVALUATION ADULT. - PROBLEM SELECTOR PLAN 3
Chronic   Ammonia elevated @ 118 with elevated LFTs  CT abd/pelv: liver cirrhosis, large ascites Colitis vs portal colopathy. Groundglass and dependent subsegmental atelectasis of lungs. No pleural effusion.   Hold home lasix and spironolactone given hypotension - fluid removal w/ paracentesis  R/o SBP, perform tap, f/u cultures and fluid analysis  On empiric antibiotics

## 2021-11-28 NOTE — DIETITIAN INITIAL EVALUATION ADULT. - PROBLEM SELECTOR PLAN 5
Troponin 268, CKMB 10, CPK 11.9 on admission, likely 2/2 demand ischemia from shock and renal insufficiency  F/u repeat Yvonne  Consider TTE and cardio consult

## 2021-11-28 NOTE — DIETITIAN INITIAL EVALUATION ADULT. - ADD RECOMMEND
SLP evaluation when medically feasible. Recommend MVI wit minerals, Folic acid & thiamine supplementation.

## 2021-11-28 NOTE — PROGRESS NOTE ADULT - PROBLEM SELECTOR PLAN 3
Chronic   Ammonia elevated @ 118 with elevated LFTs  CT abd/pelv: liver cirrhosis, large ascites Colitis vs portal colopathy. Groundglass and dependent subsegmental atelectasis of lungs. No pleural effusion.   Hold home lasix and spironolactone given hypotension - fluid removal w/ paracentesis  R/o SBP, perform tap, f/u cultures and fluid analysis  On empiric antibiotics Chronic   Ammonia elevated @ 118 with elevated LFTs  CT abd/pelv: liver cirrhosis, large ascites Colitis vs portal colopathy. Groundglass and dependent subsegmental atelectasis of lungs. No pleural effusion.   Hold home lasix and spironolactone given hypotension - fluid removal w/ paracentesis; s/p paracentesis today  R/o SBP,  f/u cultures and fluid analysis  On empiric antibiotics

## 2021-11-28 NOTE — PROGRESS NOTE ADULT - ASSESSMENT
ASSESSMENT:  VALERIE NICHOLS is a 56y Female with history of     PLAN:    NEURO:  -Mental status: AMS 2/2 HE.  -Sedation: Low dose precedex drip.  -Pain control: Precedex  -Ammonia 118 this AM. Recheck AM ammonia level.  -Continue lactulose 30mg Q8 + rifamixin 550mg BID.    RESPIRATORY:   -Monitor pulse ox  -Previously tachypneic likely 2/2 large ascites causing decreased diaphragmatic excursion.  -SPO2 100% on 3L NC.    CARDIOVASCULAR:  -Telemetry  -Hypotensive in the setting of large volume ascites, low albumin, and 3rd spacing.  -Received 2500cc fluid in ED.  -Ordered 250cc albumin x 2. Follow up with 50cc albumin Q6.  -Goal MAP 65-75.  -Trend troponins. Likely elevated 2/2 demand ischemia.  -F/u TTE results.    GI/NUTRITION:  -Diet: NPO except meds  -GI ppx: None  -Bowel regimen: None - Continue lactulose (+rifamixin)  -CTAP results: Liver Cirrhosis. Previous hospital admission removed 12L on 11/12 at [place].  -Bedside paracentesis by Dr. White removed 3400ccs of serous fluid.  -F/u AM Ammonia level and trend daily.  -F/u AFP.  -F/u abdominal fluid cultures, cell count and differential.    GENITOURINARY/RENAL:  -Monitor electrolytes; replete PRN  -Monitor I/Os  -Making minimal urine today -> ~300ccs in 24 hrs despite multiple fluid boluses.  -Trend BUN/Cr. Cr on prior admission on 11/18 was 1.5 Today Cr. 4.3 on admission.  -Based on criteria, prerenal likely in setting of decreased renal perfusion 2/2 possible hepatorenal syndrome.  -Continue Octreotide for suspicion of hepatorenal syndrome.  -F/u renal recommendations.  -F/u UA, SAKINA, urine lytes.    HEMATOLOGIC:  -DVT ppx: Heparin 5000 SubQ Q8 + SCDs    INFECTIOUS DISEASE:  -Afebrile  -Empiric Rocephin for possible SBP.  -F/u paracentesis fluid cultures, cell count and differential.  -Trend AM Lactate and Procal.    ENDOCRINE:  No active issues.    CODE STATUS: Full Code    TUBES/LINES/DRAINS:  Barron  2 Peripheral IVs Upper Extremities Bilaterally
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
## 2021-11-28 NOTE — DIETITIAN INITIAL EVALUATION ADULT. - PROBLEM SELECTOR PLAN 2
patient with known cirrhosis with encephalopathy with elevated ammonia  would consider rifaximin and lactulose
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
## 2021-11-28 NOTE — DIETITIAN INITIAL EVALUATION ADULT. - PROBLEM SELECTOR PLAN 1
VIDHI on CKD  Cr 4.3 on admission with Cr @1.5 on 11/18, 2/2 pre-renal vs shock vs nephropathy  metabolic acidosis noted on ABG  S/p 500mL NS bolus x2, gave 1L LR bolus  Monitor with routine CMP  Strict Is&Os, patricia inserted   F/u UA, SAKINA, AFP  Nephrology consult per MICU

## 2021-11-28 NOTE — DIETITIAN INITIAL EVALUATION ADULT. - PERTINENT LABORATORY DATA
(11/28) Hgb 9.9, Hct 28.7, NH3 175, Na 129, BUN 84, Creat 4.3, ALb 2.0, TBili 1.4, Alkphos 122, AST 86, GFR 12, Phos 7.9

## 2021-11-28 NOTE — PROGRESS NOTE ADULT - SUBJECTIVE AND OBJECTIVE BOX
Interval events:     Review of Systems:  Constitutional: No fever, chills, fatigue  Neuro: No headache, numbness, weakness  Resp: No cough, wheezing, shortness of breath  CVS: No chest pain, palpitations, leg swelling  GI: No abdominal pain, nausea, vomiting, diarrhea   : No dysuria, frequency, incontinence  Skin: No itching, burning, rashes, or lesions   Msk: No joint pain or swelling  Psych: No depression, anxiety, mood swings    T(F): 96.9 (21 @ 12:00), Max: 98.4 (21 @ 17:46)  HR: 69 (21 @ 14:10) (60 - 105)  BP: 84/57 (21 @ 14:10) (74/47 - 116/81)  RR: 34 (21 @ 14:10) (12 - 34)  SpO2: 100% (21 @ 14:10) (92% - 100%)  Wt(kg): --      21 @ 07:01  -  21 @ 07:00  --------------------------------------------------------  IN: 1000 mL / OUT: 300 mL / NET: 700 mL    21 @ 07:01  -  21 @ 14:32  --------------------------------------------------------  IN: 255.1 mL / OUT: 5 mL / NET: 250.1 mL        CAPILLARY BLOOD GLUCOSE      POCT Blood Glucose.: 85 mg/dL (2021 18:13)      I&O's Summary    2021 07:01  -  2021 07:00  --------------------------------------------------------  IN: 1000 mL / OUT: 300 mL / NET: 700 mL    2021 07:01  -  2021 14:32  --------------------------------------------------------  IN: 255.1 mL / OUT: 5 mL / NET: 250.1 mL        Physical Exam:   Gen:  Neuro:  HEENT:  Resp:  CVS:  Abd:  Ext:  Skin:    Meds:  cefTRIAXone   IVPB IV Intermittent  rifAXIMin Oral    midodrine Oral    octreotide  Injectable SubCutaneous      dexMEDEtomidine Infusion IV Continuous  melatonin Oral      heparin   Injectable SubCutaneous    lactulose Syrup Oral      albumin human 25% IVPB IV Intermittent      chlorhexidine 2% Cloths Topical                              9.9    7.57  )-----------( 170      ( 2021 06:53 )             28.4           129<L>  |  102  |  84<H>  ----------------------------<  77  4.4   |  13<L>  |  4.00<H>    Ca    9.0      2021 06:53  Phos  7.9       Mg     2.5         TPro  6.1  /  Alb  2.0<L>  /  TBili  1.4<H>  /  DBili  x   /  AST  86<H>  /  ALT  43  /  AlkPhos  122<H>      Lactate 2.3            @ 00:07    Lactate 3.0            @ 18:56      CARDIAC MARKERS ( 2021 18:56 )  x     / x     / 84 U/L / x     / 10.0 ng/mL      PT/INR - ( 2021 18:56 )   PT: 17.6 sec;   INR: 1.54 ratio         PTT - ( 2021 18:56 )  PTT:38.9 sec  Urinalysis Basic - ( 2021 01:23 )    Color: Yellow / Appearance: Slightly Turbid / S.015 / pH: x  Gluc: x / Ketone: Trace  / Bili: Negative / Urobili: Negative   Blood: x / Protein: 30 mg/dL / Nitrite: Negative   Leuk Esterase: Trace / RBC: 11-25 /HPF / WBC 0-2   Sq Epi: x / Non Sq Epi: Few / Bacteria: Occasional          Rapid RVP Result: NotDetec ( @ 18:56)          Radiology:  < from: CT Abdomen and Pelvis No Cont (21 @ 19:15) >  EXAM:  CT ABDOMEN AND PELVIS                            PROCEDURE DATE:  2021          INTERPRETATION:  CLINICAL INFORMATION: Cirrhosis. Evaluate for infection.    COMPARISON: None.    CONTRAST/COMPLICATIONS:  IV Contrast: NONE  0 cc administered   0 cc discarded  Oral Contrast: NONE  Complications: None reported at time of study completion    PROCEDURE:  CT of the Abdomen and Pelvis was performed.  Sagittal and coronal reformats were performed.    FINDINGS:    LOWER CHEST: Main pulmonaryartery is borderline enlarged which may reflect pulmonary arterial hypertension. Nonspecific geographic groundglass of the visualized lung parenchyma and dependent subsegmental atelectasis. Pulmonary congestion versus other atypical infection can be considered. No pleural effusion of the visualized thorax. Thin-walled lung cyst versus focal emphysema of the right upper lobe, image 9 series 3.    Solid organ evaluation is limited due to noncontrast technique.    LIVER: Cirrhotic liver. Correlate with AFP level and MRI abdomen for HCC screening.  BILE DUCTS: No biliary distention.  GALLBLADDER: Gallbladder is not visualized due to significant surrounding ascites and streak artifact. Correlate with surgical history.  SPLEEN: Normal size. Calcified granuloma.  PANCREAS: No main pancreatic ductal dilatation.  ADRENALS: Unremarkable.  KIDNEYS/URETERS: No hydronephrosis.    BLADDER: Underdistended.  REPRODUCTIVE ORGANS: Uterus and adnexa suboptimally characterized on CT.    BOWEL: Limited evaluation due to lack of oral contrast. The stomach is underdistended. No small bowel distention. Appendix appears nondistended, suboptimally assessed. Mild right-sided and transverse colonic wall thickening versus portal colopathy. Correlate with clinicalsymptoms.  PERITONEUM: Large ascites. Mild mesenteric haziness.  VESSELS: No aneurysm of the abdominal aorta.  RETROPERITONEUM/LYMPH NODES: Small volume nodes of the upper abdomen and pelvis.  ABDOMINAL WALL: Soft tissue edema.  BONES: Multilevel degenerative change of the visualized bones. Please note that central canal and neural foramina are not adequately assessed on this study.    IMPRESSION:    Limited noncontrast study.    Liver cirrhosis. Large ascites.    Colitis versus portal colopathy as described above. Correlate with clinical symptoms.    Nonspecific geographic groundglass of the visualized lung parenchyma and dependent subsegmental atelectasis.  Pulmonary congestion versus other atypical infection can be considered. No pleural effusion of the visualized thorax.    Additional findings as above.    --- End of Report ---    < end of copied text >      Bedside ultrasound: Large Volume Peritoneal Ascites.    Tubes/lines: Barron Catheter. 2 Peripheral IV Lines.      GLOBAL ISSUE/BEST PRACTICE:  Analgesia: Precedex  Sedation: Precedex  HOB elevation: yes  Stress ulcer prophylaxis: None  VTE prophylaxis: Heparin SubQ 5000 Q8  Glycemic control: None  Nutrition: NPO except meds    CODE STATUS: FUll Code

## 2021-11-28 NOTE — DIETITIAN INITIAL EVALUATION ADULT. - SIGNS/SYMPTOMS
as evidenced by hx ETOH abuse/liver cirrhosis, TBili 1.4, NH3 175, elevated LFTs as evidenced by dx VIDHI, BUN 84/Creat 4.0/Phos 7.9

## 2021-11-28 NOTE — PROGRESS NOTE ADULT - ATTENDING COMMENTS
56F PMH history of EtOH abuse with liver cirrhosis, hepatic encephalopathy, portal hypertension, and ascites s/p recent drainage of 12 liters on 11/12/21 who now presents with acute hepatic encephalopathy with associated hypotension, lactic acidosis, and VIDHI worrisome for hepatorenal syndrome. Found to have likely abdominal compartment syndrome with elevated bladder pressure (26-29 mm Hg) s/p US-guided paracentesis today with drainage of 3.4 liters (intra-abdominal pressure initially 26, improved to 13).    1. Neuro: continue lactulose 30 gm q8h + rifaximin 550 mg bid. Monitor ammonia. Continue dexmedetomidine for comfort  2. CV: shock, likely multifactorial due to cirrhosis (distributive) + hypovolemia + abdominal compartment syndrome. Start norepinephrine with goal MAP>65 + midodrine 10 mg q8h + octreotide 100 mg sq q8h + albumin. S/p 2.5 liters crystalloid overnight followed by albumin 5% 250 mL this morning. Will given additional albumin post-paracentesis  3. Pulm: stable respiratory status, on 2 liters NC with SpO2>90%  4. GI: s/p US-guided paracentesis with drainage of 3.4 liters. Follow-up cell count, chemistries, cultures, and cytopath. Intra-abdominal pressure improved from 26 mm Hg to 13 mm Hg post-drainage. Hold furosemide and spironolactone for now given VIDHI  5. Renal: VIDHI, likely ATN due to hypovolemia + sepsis/shock + possible hepatorenal syndrome. Continue norepinephrine + octreotide + albumin. Strict I/O's, monitor kidney function and lytes  6. ID: continue ceftriaxone for possible SBP, f/up peritoneal fluid analysis. UA negative, f/up urine/blood cultures  7. Heme: HSQ for DVT ppx  8. Endo: no active issues  9. Skin: no lines, keep patricia  10. Dispo: full code, discussed with billy Vuong by phone and mother/daughter at bedside, prognosis guarded  CC time spent: 80 min

## 2021-11-29 NOTE — PROGRESS NOTE ADULT - PROBLEM SELECTOR PLAN 3
Chronic   Ammonia elevated @ 118 with elevated LFTs  CT abd/pelv: liver cirrhosis, large ascites Colitis vs portal colopathy. Groundglass and dependent subsegmental atelectasis of lungs. No pleural effusion.   Hold home lasix and spironolactone given hypotension - fluid removal w/ paracentesis; s/p paracentesis today  R/o SBP,  f/u cultures and fluid analysis  On empiric antibiotics

## 2021-11-29 NOTE — CHART NOTE - NSCHARTNOTEFT_GEN_A_CORE
Time of evaluation: 22:25    Called to evaluate patient for restraints    Other interventions attempted: de-escalation, orientation check, environment modification, medication assessment    REVIEW OF SYSTEMS:  [X  ]Unable to perfrom ROS due to: Clinical condition   [  ] ROS reviewed and all negative    PAST MEDICAL & SURGICAL HISTORY:  History of cirrhosis of liver    ETOH abuse    Thyroid goiter    S/P abdominal paracentesis      MEDICATIONS  (STANDING):  albumin human 25% IVPB 50 milliLiter(s) IV Intermittent every 6 hours  chlorhexidine 2% Cloths 1 Application(s) Topical <User Schedule>  dexMEDEtomidine Infusion 0.006 MICROgram(s)/kG/Hr (0.1 mL/Hr) IV Continuous <Continuous>  heparin   Injectable 5000 Unit(s) SubCutaneous every 8 hours  lactulose Syrup 30 Gram(s) Oral every 8 hours  melatonin 5 milliGRAM(s) Oral at bedtime  meropenem  IVPB      meropenem  IVPB 500 milliGRAM(s) IV Intermittent every 12 hours  norepinephrine Infusion 0.05 MICROgram(s)/kG/Min (6.25 mL/Hr) IV Continuous <Continuous>  octreotide  Injectable 100 MICROGram(s) SubCutaneous every 6 hours  rifAXIMin 550 milliGRAM(s) Oral two times a day  vasopressin Infusion 0.04 Unit(s)/Min (2.4 mL/Hr) IV Continuous <Continuous>    MEDICATIONS  (PRN):                          8.0    11.95 )-----------( 140      ( 30 Nov 2021 05:53 )             22.7     11-30    135  |  107  |  90<H>  ----------------------------<  102<H>  4.6   |  13<L>  |  4.10<H>    Ca    9.1      30 Nov 2021 05:53  Phos  7.8     11-30  Mg     2.7     11-30    TPro  5.7<L>  /  Alb  3.1<L>  /  TBili  1.5<H>  /  DBili  x   /  AST  84<H>  /  ALT  34  /  AlkPhos  76  11-30      Vital Signs Last 24 Hrs  T(C): 37.2 (29 Nov 2021 20:14), Max: 37.2 (29 Nov 2021 20:14)  T(F): 99 (29 Nov 2021 20:14), Max: 99 (29 Nov 2021 20:14)  HR: 96 (30 Nov 2021 06:00) (63 - 97)  BP: 95/54 (30 Nov 2021 06:00) (81/51 - 110/60)  BP(mean): 68 (30 Nov 2021 06:00) (61 - 79)  RR: 28 (30 Nov 2021 06:00) (14 - 60)  SpO2: 93% (30 Nov 2021 06:00) (88% - 100%)    Physical Examination:  General: No acute distress.    HEENT: Pupils equal, round, reactive to light. Symmetric.  PULM: Clear to auscultation bilaterally, no significant sputum production  CVS: Regular rate and rhythm, no murmurs, rubs, or gallops  ABD: Soft, nondistended, nontender, normoactive bowel sounds, no masses  EXT: No edema, nontender  SKIN: Warm and well perfused.      [  ] Unable to perform physical exam due to    [X ] I have evaluated this patient and have determined that restraints are warranted to optimize medical care. Patient was assessed for current physical and psychological risk factors as well as special needs. There are no medical conditions or limitations that would place this patient at risk while in restraints.    Type of restraint: Bilateral soft wrist restraints    Behavioral criteria for discontinuation of restraint: [ X ] See order    Attending MD Aware

## 2021-11-29 NOTE — PROGRESS NOTE ADULT - ASSESSMENT
Assessment:  VALERIE NICHOLS is a 56y Female with history of liver cirrhosis and HTN BIB niece for AMS and dyspnea. She is admitted for large volume ascites 2/2 cirrhosis and work-up of SBP and possible hepatorenal syndrome.     Problem List:  1. Shock, distributive hypoalbuminemia vs sepsis)   2. Hepatic encephalopathy   3. Metabolic acidosis   4. Acute decompensated cirrhosis   5. VIDHI, HRS?     Plan:  Neuro: Avoid neurosupressants. c/w Lactulose and Rifaximin F/u ammonia AM   CV:  Pulm:  Renal:  GI:  ID:  Heme:  Endo:    CRITICAL CARE TIME SPENT: 42 minutes assessing presenting problems of acute illness, which pose high probability of life threatening deterioration or end organ damage/dysfunction, as well as medical decision making including initiating plan of care, reviewing data, reviewing radiologic exams, discussing with multidisciplinary team,  discussing goals of care with patient/family, and writing this note.  Non-inclusive of procedures performed   Assessment:  VALERIE NICHOLS is a 56y Female with history of liver cirrhosis and HTN BIB niece for AMS and dyspnea. She is admitted for large volume ascites 2/2 cirrhosis and work-up of SBP and possible hepatorenal syndrome.     Problem List:  1. Shock, distributive hypoalbuminemia vs sepsis)   2. Hepatic encephalopathy   3. Metabolic acidosis   4. Acute decompensated cirrhosis   5. VIDHI, HRS?     Plan:  Neuro: Avoid neurosupressants. c/w Lactulose and Rifaximin F/u ammonia AM   CV: Shock state distributive, likely hypoalbuminemia vs septic. Actively titrating vasopressor requirements to maintain MAP>65mmHg. Currently in dual vasopressor shock. TTE wnl. Albumin q6h. Monitor HR and BP  Pulm: No acute issues, currently protecting airway and compensating for metabolic acidosis   Renal: VIDHI, pre-renal vs HRS. Renal on board. Metabolic acidosis, not improving as of yet. Strict I/Os, goal UOP >0.5cc/kg/hr. Trend renal function and electrolytes, replete as needed. Avoid nephrotoxic agents  GI: PPI, NPO, likely start tube feeds in AM   ID: Paracentesis and blood culture NGTD. Patient now on Merrem and vancomycin given worsening shock state. Trend WBC/fevers/procal  Heme: INR 2, slightly elevated, expected w/acute decompensated hepatic failure. C/w heparin sub q for DVT prophylaxis. Vit K if signs of bleeding    Endo: No acute issues. Goal blood glucose <180. Will order cortisol level for AM. If worsening shock state, will consider adding stress dose steroids given decompensated liver failure and shock state     Dispo: Patient followed by [facility]. Patient unfortunately not candidate for transplant. Prognosis poor     CRITICAL CARE TIME SPENT: 42 minutes assessing presenting problems of acute illness, which pose high probability of life threatening deterioration or end organ damage/dysfunction, as well as medical decision making including initiating plan of care, reviewing data, reviewing radiologic exams, discussing with multidisciplinary team,  discussing goals of care with patient/family, and writing this note.  Non-inclusive of procedures performed

## 2021-11-29 NOTE — PROGRESS NOTE ADULT - SUBJECTIVE AND OBJECTIVE BOX
Patient is a 56y old  Female who presents with a chief complaint of acute renal failure/hepatic encephalopathy (2021 14:32)      SUBJECTIVE / OVERNIGHT EVENTS: No On events. s/p paracentesis yesterday           ADDITIONAL REVIEW OF SYSTEMS: Negative except for above    MEDICATIONS  (STANDING):  albumin human 25% IVPB 50 milliLiter(s) IV Intermittent every 6 hours  cefTRIAXone   IVPB 1000 milliGRAM(s) IV Intermittent every 24 hours  chlorhexidine 2% Cloths 1 Application(s) Topical <User Schedule>  dexMEDEtomidine Infusion 0.006 MICROgram(s)/kG/Hr (0.1 mL/Hr) IV Continuous <Continuous>  heparin   Injectable 5000 Unit(s) SubCutaneous every 8 hours  lactulose Syrup 30 Gram(s) Oral every 8 hours  melatonin 5 milliGRAM(s) Oral at bedtime  midodrine 5 milliGRAM(s) Oral every 8 hours  norepinephrine Infusion 0.05 MICROgram(s)/kG/Min (6.25 mL/Hr) IV Continuous <Continuous>  octreotide  Injectable 100 MICROGram(s) SubCutaneous every 6 hours  rifAXIMin 550 milliGRAM(s) Oral two times a day    MEDICATIONS  (PRN):      CAPILLARY BLOOD GLUCOSE        I&O's Summary    2021 07:  -  2021 07:00  --------------------------------------------------------  IN: 1357.1 mL / OUT: 395 mL / NET: 962.1 mL    2021 07:01  -  2021 12:38  --------------------------------------------------------  IN: 180.6 mL / OUT: 75 mL / NET: 105.6 mL        PHYSICAL EXAM:  Vital Signs Last 24 Hrs  T(C): 32.9 (2021 11:00), Max: 36.1 (2021 13:45)  T(F): 91.2 (2021 11:00), Max: 97 (2021 13:45)  HR: 68 (2021 11:00) (62 - 82)  BP: 90/53 (2021 11:00) (77/52 - 118/67)  BP(mean): 66 (2021 11:00) (58 - 92)  RR: 27 (2021 11:00) (14 - 58)  SpO2: 99% (2021 11:00) (75% - 100%)    PHYSICAL EXAM:  GENERAL: NAD, well-developed NC  HEAD:  Atraumatic, Normocephalic  NECK: Supple, No JVD  CHEST/LUNG: Clear to auscultation bilaterally;   HEART: Regular rate and rhythm;   ABDOMEN: Soft, Nontender, distended; Bowel sounds present  EXTREMITIES:  2+ Peripheral Pulses, +edema b/l           LABS:                        8.5    10.54 )-----------( 153      ( 2021 06:52 )             24.3     1129    132<L>  |  104  |  88<H>  ----------------------------<  101<H>  4.4   |  13<L>  |  4.00<H>    Ca    9.7      2021 06:52  Phos  8.0       Mg     2.4         TPro  6.4  /  Alb  3.1<L>  /  TBili  1.8<H>  /  DBili  x   /  AST  83<H>  /  ALT  37  /  AlkPhos  92  11-29    PT/INR - ( 2021 06:52 )   PT: 23.3 sec;   INR: 2.06 ratio         PTT - ( 2021 18:56 )  PTT:38.9 sec  CARDIAC MARKERS ( 2021 18:56 )  x     / x     / 84 U/L / x     / 10.0 ng/mL      Urinalysis Basic - ( 2021 01:23 )    Color: Yellow / Appearance: Slightly Turbid / S.015 / pH: x  Gluc: x / Ketone: Trace  / Bili: Negative / Urobili: Negative   Blood: x / Protein: 30 mg/dL / Nitrite: Negative   Leuk Esterase: Trace / RBC: 11-25 /HPF / WBC 0-2   Sq Epi: x / Non Sq Epi: Few / Bacteria: Occasional        Culture - Fungal, Body Fluid (collected 2021 19:10)  Source: .Body Fluid Peritoneal Fluid  Preliminary Report (2021 11:35):    Testing in progress    Culture - Body Fluid with Gram Stain (collected 2021 19:10)  Source: .Body Fluid Peritoneal Fluid  Gram Stain (2021 21:26):    polymorphonuclear leukocytes seen    No organisms seen    by cytocentrifuge    Culture - Blood (collected 2021 01:00)  Source: .Blood Blood-Peripheral  Preliminary Report (2021 01:01):    No growth to date.    Culture - Blood (collected 2021 01:00)  Source: .Blood Blood-Peripheral  Preliminary Report (2021 01:01):    No growth to date.        RADIOLOGY & ADDITIONAL TESTS:    Imaging Personally Reviewed:    Electrocardiogram Personally Reviewed:    COORDINATION OF CARE:  Care Discussed with Consultants/Other Providers [Y/N]:  Prior or Outpatient Records Reviewed [Y/N]:

## 2021-11-29 NOTE — PROCEDURE NOTE - NSPOSTCAREGUIDE_GEN_A_CORE
Care for catheter as per unit/ICU protocols
Instructed patient/caregiver regarding signs and symptoms of infection
Care for catheter as per unit/ICU protocols

## 2021-11-29 NOTE — PROGRESS NOTE ADULT - ASSESSMENT
VIDHI: Likely multifactorial: Prerenal azotemia, R/o HRS, ? Tamponade effect, Hemodynamic ATN  h/o Alcoholic cirrhosis  Hyponatremia  Metabolic acidosis  Anemia  Hepatic encephalopathy  Ascites, s/p paracenetesis 11/28  Hypotension    s/p paracentesis. Monitor urine out put. Urine studies pending. On Octreotide, midodrine and albumin. Rx for hepatic encephalopathy. Monitor h/h trend.   Avoid nephrotoxic meds as possible. Avoid ACEI, ARB, NSAIDs and IV contrast. Will follow electrolytes and renal function trend. D/w ICU team.

## 2021-11-29 NOTE — PROCEDURE NOTE - NSSITEPREP_SKIN_A_CORE
Adherence to aseptic technique: hand hygiene prior to donning barriers (gown, gloves), don cap and mask, sterile drape over patient
chlorhexidine
Adherence to aseptic technique: hand hygiene prior to donning barriers (gown, gloves), don cap and mask, sterile drape over patient

## 2021-11-29 NOTE — PROCEDURE NOTE - NSPROCDETAILS_GEN_ALL_CORE
guidewire recovered/lumen(s) aspirated and flushed/sterile dressing applied/sterile technique, catheter placed/ultrasound guidance with use of sterile gel and probe cove
location identified, sterile technique used, catheter introduced, fluid drained/Seldinger technique
location identified, draped/prepped, sterile technique used, needle inserted/introduced/positive blood return obtained via catheter/connected to a pressurized flush line/sutured in place/Seldinger technique/all materials/supplies accounted for at end of procedure
No

## 2021-11-29 NOTE — PROGRESS NOTE ADULT - SUBJECTIVE AND OBJECTIVE BOX
Patient is a 56y old  Female who presents with a chief complaint of acute renal failure/hepatic encephalopathy (2021 20:19)    BRIEF HOSPITAL COURSE:   HPI:  55 y/o F with PMH of liver cirrhosis and HTN BIB niece for altered mental status and dyspnea. Pt was in bed and the niece noticed she had increased work of breathing, increased abdominal distention, and seemed more altered compared to 3 days ago, when she last saw her. The pt told her niece that her last meal might have been some time yesterday, but was unsure. Of note, the pt was a heavy drinker 10+ years ago and since then has denied having alcohol. Pt was tapped 2 weeks ago. On exam, pt was A&O2-3, complaining of abdominal distention and discomfort, and dyspnea, but denied fever/chills, chest pain, n/v/c/d.    ED Course:  Vital Signs: T 98.4F   BP 74/47      RR 22   O2 99% on RA  Significant labs: Hgb 10.3, Hct 28.8, Ammonia 118, Lactate 3.0, Na 132, BUN 83, Cr 4.3, Albumin 1.9, Tbili 1.3, AST/ALT 93/14, Troponin 268, CKMB 10, CPK 11.9  ABG significant for: pCO2 20, bicarb 11  Given: 500mg Levofloxacin x1, 500mL NS bolus x2, 30g Lactulose x1  EKG: NSR  CXR (read by me): decreased lung volumes b/l, left sided   CT head: Cerebral volume loss more than expected for patient's age. Patchy cerebral white matter hypodensities, nonspecific, although likely on the basis of chronic microangiopathy.  CT abd/pelv: liver cirrhosis, large ascites Colitis vs portal colopathy. Groundglass and dependent subsegmental atelectasis of lungs. No pleural effusion.  (2021 21:53)    Events last 24 hours:   - Increasing vasopressor requirements, now on Levophed & Vasopressin   - Patient more awake now, still lethargic and encephalopathic   - Abx broadened 2/2 distributive shock     PAST MEDICAL & SURGICAL HISTORY:  History of cirrhosis of liver  ETOH abuse  Thyroid goiter  S/P abdominal paracentesis    Medications:  meropenem  IVPB      rifAXIMin 550 milliGRAM(s) Oral two times a day  norepinephrine Infusion 0.05 MICROgram(s)/kG/Min IV Continuous <Continuous>  dexMEDEtomidine Infusion 0.006 MICROgram(s)/kG/Hr IV Continuous <Continuous>  melatonin 5 milliGRAM(s) Oral at bedtime  heparin   Injectable 5000 Unit(s) SubCutaneous every 8 hours  lactulose Syrup 30 Gram(s) Oral every 8 hours  octreotide  Injectable 100 MICROGram(s) SubCutaneous every 6 hours  vasopressin Infusion 0.04 Unit(s)/Min IV Continuous <Continuous>  albumin human 25% IVPB 50 milliLiter(s) IV Intermittent every 6 hours  chlorhexidine 2% Cloths 1 Application(s) Topical <User Schedule>    ICU Vital Signs Last 24 Hrs  T(C): 37.2 (2021 20:14), Max: 37.2 (2021 20:14)  T(F): 99 (2021 20:14), Max: 99 (2021 20:14)  HR: 87 (2021 23:00) (63 - 97)  BP: 95/61 (2021 22:00) (81/51 - 113/66)  BP(mean): 70 (2021 22:00) (61 - 84)  ABP: 107/50 (2021 23:00) (98/43 - 127/66)  ABP(mean): 68 (2021 23:00) (61 - 86)  RR: 25 (2021 23:00) (14 - 60)  SpO2: 93% (2021 23:00) (93% - 100%)    ABG - ( 2021 06:10 )  pH, Arterial: 7.22  pH, Blood: x     /  pCO2: 26    /  pO2: 114   / HCO3: 11    / Base Excess: -17.1 /  SaO2: 99.7      I&O's Detail    2021 07:01  -  2021 07:00  --------------------------------------------------------  IN:    Albumin 25%  -  50 mL: 200 mL    Albumin 5%  - 250 mL: 500 mL    Dexmedetomidine: 102.6 mL    IV PiggyBack: 50 mL    Norepinephrine: 504.5 mL  Total IN: 1357.1 mL    OUT:    Indwelling Catheter - Urethral (mL): 395 mL    Norepinephrine: 0 mL  Total OUT: 395 mL    Total NET: 962.1 mL    2021 07:01  -  2021 23:19  --------------------------------------------------------  IN:    Albumin 25%  -  50 mL: 50 mL    Albumin 5%  - 250 mL: 50 mL    Dexmedetomidine: 16.9 mL    IV PiggyBack: 50 mL    Norepinephrine: 784.8 mL    Vasopressin: 14.4 mL  Total IN: 966.1 mL    OUT:    Indwelling Catheter - Urethral (mL): 155 mL  Total OUT: 155 mL    Total NET: 811.1 mL    LABS:                        8.5    10.54 )-----------( 153      ( 2021 06:52 )             24.3         132<L>  |  104  |  88<H>  ----------------------------<  101<H>  4.4   |  13<L>  |  4.00<H>    Ca    9.7      2021 06:52  Phos  8.0       Mg     2.4         TPro  6.4  /  Alb  3.1<L>  /  TBili  1.8<H>  /  DBili  x   /  AST  83<H>  /  ALT  37  /  AlkPhos  92      PT/INR - ( 2021 06:52 )   PT: 23.3 sec;   INR: 2.06 ratio      Urinalysis Basic - ( 2021 01:23 )  Color: Yellow / Appearance: Slightly Turbid / S.015 / pH: x  Gluc: x / Ketone: Trace  / Bili: Negative / Urobili: Negative   Blood: x / Protein: 30 mg/dL / Nitrite: Negative   Leuk Esterase: Trace / RBC: 11-25 /HPF / WBC 0-2   Sq Epi: x / Non Sq Epi: Few / Bacteria: Occasional    CULTURES:  Culture Results:   No growth ( @ 19:10)  Culture Results:   Testing in progress ( @ 19:10)  Culture Results:   No growth to date. ( @ 01:00)  Culture Results:   No growth to date. ( @ 01:00)  Rapid RVP Result: NotDetec ( @ 18:56)    Physical Examination:    General: Confused and lethargic     HEENT: Pupils equal, reactive to light.  Symmetric.    PULM: Clear to auscultation bilaterally, no significant sputum production    NECK: Supple, no lymphadenopathy, trachea midline    CVS: Regular rate and rhythm, no murmurs, rubs, or gallops    ABD: Soft, distended, nontender, normoactive bowel sounds, no masses    EXT: No edema, nontender    SKIN: Warm and well perfused    RADIOLOGY:   < from: CT Abdomen and Pelvis No Cont (21 @ 19:15) >    EXAM:  CT ABDOMEN AND PELVIS                          PROCEDURE DATE:  2021      INTERPRETATION:  CLINICAL INFORMATION: Cirrhosis. Evaluate for infection.    COMPARISON: None.    CONTRAST/COMPLICATIONS:  IV Contrast: NONE  0 cc administered   0 cc discarded  Oral Contrast: NONE  Complications: None reported at time of study completion    PROCEDURE:  CT of the Abdomen and Pelvis was performed.  Sagittal and coronal reformats were performed.    FINDINGS:    LOWER CHEST: Main pulmonaryartery is borderline enlarged which may reflect pulmonary arterial hypertension. Nonspecific geographic groundglass of the visualized lung parenchyma and dependent subsegmental atelectasis. Pulmonary congestion versus other atypical infection can be considered. No pleural effusion of the visualized thorax. Thin-walled lung cyst versus focal emphysema of the right upper lobe, image 9 series 3.    Solid organ evaluation is limited due to noncontrast technique.    LIVER: Cirrhotic liver. Correlate with AFP level and MRI abdomen for HCC screening.  BILE DUCTS: No biliary distention.  GALLBLADDER: Gallbladder is not visualized due to significant surrounding ascites and streak artifact. Correlate with surgical history.  SPLEEN: Normal size. Calcified granuloma.  PANCREAS: No main pancreatic ductal dilatation.  ADRENALS: Unremarkable.  KIDNEYS/URETERS: No hydronephrosis.    BLADDER: Underdistended.  REPRODUCTIVE ORGANS: Uterus and adnexa suboptimally characterized on CT.    BOWEL: Limited evaluation due to lack of oral contrast. The stomach is underdistended. No small bowel distention. Appendix appears nondistended, suboptimally assessed. Mild right-sided and transverse colonic wall thickening versus portal colopathy. Correlate with clinicalsymptoms.  PERITONEUM: Large ascites. Mild mesenteric haziness.  VESSELS: No aneurysm of the abdominal aorta.  RETROPERITONEUM/LYMPH NODES: Small volume nodes of the upper abdomen and pelvis.  ABDOMINAL WALL: Soft tissue edema.  BONES: Multilevel degenerative change of the visualized bones. Please note that central canal and neural foramina are not adequately assessed on this study.    IMPRESSION:    Limited noncontrast study.    Liver cirrhosis. Large ascites.    Colitis versus portal colopathy as described above. Correlate with clinical symptoms.    Nonspecific geographic groundglass of the visualized lung parenchyma and dependent subsegmental atelectasis.  Pulmonary congestion versus other atypical infection can be considered. No pleural effusion of the visualized thorax.    Additional findings as above.    --- End of Report ---    MICHAEL HURT M.D., ATTENDING RADIOLOGIST  This document has been electronically signed. 2021  7:36PM    < end of copied text >     Patient is a 56y old  Female who presents with a chief complaint of acute renal failure/hepatic encephalopathy (2021 20:19)    BRIEF HOSPITAL COURSE:   HPI:  57 y/o F with PMH of liver cirrhosis and HTN BIB niece for altered mental status and dyspnea. Pt was in bed and the niece noticed she had increased work of breathing, increased abdominal distention, and seemed more altered compared to 3 days ago, when she last saw her. The pt told her niece that her last meal might have been some time yesterday, but was unsure. Of note, the pt was a heavy drinker 10+ years ago and since then has denied having alcohol. Pt was tapped 2 weeks ago. On exam, pt was A&O2-3, complaining of abdominal distention and discomfort, and dyspnea, but denied fever/chills, chest pain, n/v/c/d.    ED Course:  Vital Signs: T 98.4F   BP 74/47      RR 22   O2 99% on RA  Significant labs: Hgb 10.3, Hct 28.8, Ammonia 118, Lactate 3.0, Na 132, BUN 83, Cr 4.3, Albumin 1.9, Tbili 1.3, AST/ALT 93/14, Troponin 268, CKMB 10, CPK 11.9  ABG significant for: pCO2 20, bicarb 11  Given: 500mg Levofloxacin x1, 500mL NS bolus x2, 30g Lactulose x1  EKG: NSR  CXR (read by me): decreased lung volumes b/l, left sided   CT head: Cerebral volume loss more than expected for patient's age. Patchy cerebral white matter hypodensities, nonspecific, although likely on the basis of chronic microangiopathy.  CT abd/pelv: liver cirrhosis, large ascites Colitis vs portal colopathy. Groundglass and dependent subsegmental atelectasis of lungs. No pleural effusion.  (2021 21:53)    Events last 24 hours:   - Increasing vasopressor requirements, now on Levophed & Vasopressin   - Patient more awake now, still lethargic and encephalopathic   - Abx broadened 2/2 distributive shock   - MELD score 32    PAST MEDICAL & SURGICAL HISTORY:  History of cirrhosis of liver  ETOH abuse  Thyroid goiter  S/P abdominal paracentesis    Medications:  meropenem  IVPB      rifAXIMin 550 milliGRAM(s) Oral two times a day  norepinephrine Infusion 0.05 MICROgram(s)/kG/Min IV Continuous <Continuous>  dexMEDEtomidine Infusion 0.006 MICROgram(s)/kG/Hr IV Continuous <Continuous>  melatonin 5 milliGRAM(s) Oral at bedtime  heparin   Injectable 5000 Unit(s) SubCutaneous every 8 hours  lactulose Syrup 30 Gram(s) Oral every 8 hours  octreotide  Injectable 100 MICROGram(s) SubCutaneous every 6 hours  vasopressin Infusion 0.04 Unit(s)/Min IV Continuous <Continuous>  albumin human 25% IVPB 50 milliLiter(s) IV Intermittent every 6 hours  chlorhexidine 2% Cloths 1 Application(s) Topical <User Schedule>    ICU Vital Signs Last 24 Hrs  T(C): 37.2 (2021 20:14), Max: 37.2 (2021 20:14)  T(F): 99 (2021 20:14), Max: 99 (2021 20:14)  HR: 87 (2021 23:00) (63 - 97)  BP: 95/61 (2021 22:00) (81/51 - 113/66)  BP(mean): 70 (2021 22:00) (61 - 84)  ABP: 107/50 (2021 23:00) (98/43 - 127/66)  ABP(mean): 68 (2021 23:00) (61 - 86)  RR: 25 (2021 23:00) (14 - 60)  SpO2: 93% (2021 23:00) (93% - 100%)    ABG - ( 2021 06:10 )  pH, Arterial: 7.22  pH, Blood: x     /  pCO2: 26    /  pO2: 114   / HCO3: 11    / Base Excess: -17.1 /  SaO2: 99.7      I&O's Detail    2021 07:01  -  2021 07:00  --------------------------------------------------------  IN:    Albumin 25%  -  50 mL: 200 mL    Albumin 5%  - 250 mL: 500 mL    Dexmedetomidine: 102.6 mL    IV PiggyBack: 50 mL    Norepinephrine: 504.5 mL  Total IN: 1357.1 mL    OUT:    Indwelling Catheter - Urethral (mL): 395 mL    Norepinephrine: 0 mL  Total OUT: 395 mL    Total NET: 962.1 mL    2021 07:01  -  2021 23:19  --------------------------------------------------------  IN:    Albumin 25%  -  50 mL: 50 mL    Albumin 5%  - 250 mL: 50 mL    Dexmedetomidine: 16.9 mL    IV PiggyBack: 50 mL    Norepinephrine: 784.8 mL    Vasopressin: 14.4 mL  Total IN: 966.1 mL    OUT:    Indwelling Catheter - Urethral (mL): 155 mL  Total OUT: 155 mL    Total NET: 811.1 mL    LABS:                        8.5    10.54 )-----------( 153      ( 2021 06:52 )             24.3         132<L>  |  104  |  88<H>  ----------------------------<  101<H>  4.4   |  13<L>  |  4.00<H>    Ca    9.7      2021 06:52  Phos  8.0       Mg     2.4         TPro  6.4  /  Alb  3.1<L>  /  TBili  1.8<H>  /  DBili  x   /  AST  83<H>  /  ALT  37  /  AlkPhos  92      PT/INR - ( 2021 06:52 )   PT: 23.3 sec;   INR: 2.06 ratio      Urinalysis Basic - ( 2021 01:23 )  Color: Yellow / Appearance: Slightly Turbid / S.015 / pH: x  Gluc: x / Ketone: Trace  / Bili: Negative / Urobili: Negative   Blood: x / Protein: 30 mg/dL / Nitrite: Negative   Leuk Esterase: Trace / RBC: 11-25 /HPF / WBC 0-2   Sq Epi: x / Non Sq Epi: Few / Bacteria: Occasional    CULTURES:  Culture Results:   No growth ( @ 19:10)  Culture Results:   Testing in progress ( @ 19:10)  Culture Results:   No growth to date. ( @ 01:00)  Culture Results:   No growth to date. ( @ 01:00)  Rapid RVP Result: NotDetec ( @ 18:56)    Physical Examination:    General: Confused and lethargic     HEENT: Pupils equal, reactive to light.  Symmetric.    PULM: Clear to auscultation bilaterally, no significant sputum production    NECK: Supple, no lymphadenopathy, trachea midline    CVS: Regular rate and rhythm, no murmurs, rubs, or gallops    ABD: Soft, distended, nontender, normoactive bowel sounds, no masses    EXT: No edema, nontender    SKIN: Warm and well perfused    RADIOLOGY:   < from: CT Abdomen and Pelvis No Cont (21 @ 19:15) >    EXAM:  CT ABDOMEN AND PELVIS                          PROCEDURE DATE:  2021      INTERPRETATION:  CLINICAL INFORMATION: Cirrhosis. Evaluate for infection.    COMPARISON: None.    CONTRAST/COMPLICATIONS:  IV Contrast: NONE  0 cc administered   0 cc discarded  Oral Contrast: NONE  Complications: None reported at time of study completion    PROCEDURE:  CT of the Abdomen and Pelvis was performed.  Sagittal and coronal reformats were performed.    FINDINGS:    LOWER CHEST: Main pulmonaryartery is borderline enlarged which may reflect pulmonary arterial hypertension. Nonspecific geographic groundglass of the visualized lung parenchyma and dependent subsegmental atelectasis. Pulmonary congestion versus other atypical infection can be considered. No pleural effusion of the visualized thorax. Thin-walled lung cyst versus focal emphysema of the right upper lobe, image 9 series 3.    Solid organ evaluation is limited due to noncontrast technique.    LIVER: Cirrhotic liver. Correlate with AFP level and MRI abdomen for HCC screening.  BILE DUCTS: No biliary distention.  GALLBLADDER: Gallbladder is not visualized due to significant surrounding ascites and streak artifact. Correlate with surgical history.  SPLEEN: Normal size. Calcified granuloma.  PANCREAS: No main pancreatic ductal dilatation.  ADRENALS: Unremarkable.  KIDNEYS/URETERS: No hydronephrosis.    BLADDER: Underdistended.  REPRODUCTIVE ORGANS: Uterus and adnexa suboptimally characterized on CT.    BOWEL: Limited evaluation due to lack of oral contrast. The stomach is underdistended. No small bowel distention. Appendix appears nondistended, suboptimally assessed. Mild right-sided and transverse colonic wall thickening versus portal colopathy. Correlate with clinicalsymptoms.  PERITONEUM: Large ascites. Mild mesenteric haziness.  VESSELS: No aneurysm of the abdominal aorta.  RETROPERITONEUM/LYMPH NODES: Small volume nodes of the upper abdomen and pelvis.  ABDOMINAL WALL: Soft tissue edema.  BONES: Multilevel degenerative change of the visualized bones. Please note that central canal and neural foramina are not adequately assessed on this study.    IMPRESSION:    Limited noncontrast study.    Liver cirrhosis. Large ascites.    Colitis versus portal colopathy as described above. Correlate with clinical symptoms.    Nonspecific geographic groundglass of the visualized lung parenchyma and dependent subsegmental atelectasis.  Pulmonary congestion versus other atypical infection can be considered. No pleural effusion of the visualized thorax.    Additional findings as above.    --- End of Report ---    MICHAEL HURT M.D., ATTENDING RADIOLOGIST  This document has been electronically signed. 2021  7:36PM    < end of copied text >

## 2021-11-29 NOTE — PROGRESS NOTE ADULT - ATTENDING COMMENTS
56 F with history of EtOH abuse c/b liver cirrhosis, hepatic encephalopathy, portal hypertension, and ascites s/p recent drainage of 12 liters on 11/12/21 who now presents with acute hepatic encephalopathy with associated shock, lactic acidosis, and acute kidney injury worrisome for hepatorenal syndrome. There was concern for abdominal compartment syndrome with elevated bladder pressure (26-29 mm Hg) s/p US-guided paracentesis today with drainage of 3.4 liters (intra-abdominal pressure initially 26, improved to 13). Shock state worsening today requiring CVC placement and A-line. On two vasopressors. Remains encephalopathic although protective her airway. Overall prognosis guarded.    NEURO: Lactulose 30 gm Q8H + Rifaximin 550 mg bid. Monitor ammonia. Avoid sedatives.  CVS: Shock - likely multifactorial - concern for possible sepsis. Target MAP > 65. Continue octreotide, albumin.  PULM: Stable respiratory status  GI: Follow up ascitic fluid studies. NGT placed today.  RENAL: Concern for hepato-renal syndrome. Continue vasopressor support. Strict I/Os. Monitor creatinine.  ID: Started empiric meropenem, vancomycin given clinical deterioration. Follow up ascitic fluid cultures.  HEME: DVT PPX with HSQ  SKIN: TLC and A-line placed today  FULL CODE  Updated patient's daughter and mother at bedside  Case discussed with [facility] liver transplant service - no plan for transfer at this time  Prognosis guarded 56 F with history of EtOH abuse c/b liver cirrhosis, hepatic encephalopathy, portal hypertension, and ascites s/p recent drainage of 12 liters on 11/12/21 who now presents with acute decompensated cirrhosis and hepatic encephalopathy with associated shock, lactic acidosis, and acute kidney injury worrisome for hepatorenal syndrome. There was concern for abdominal compartment syndrome with elevated bladder pressure (26-29 mm Hg) s/p US-guided paracentesis yesterday with drainage of 3.4 liters. Shock state now worsening today requiring CVC placement and A-line. On two vasopressors. Remains encephalopathic although protective her airway. Overall prognosis guarded.    NEURO: Lactulose 30 gm Q8H + Rifaximin 550 mg bid. Monitor ammonia. Avoid sedatives.  CVS: Shock - likely multifactorial - concern for possible sepsis. Target MAP > 65. Continue octreotide, albumin.  PULM: Stable respiratory status  GI: Follow up ascitic fluid studies. NGT placed today.  RENAL: Concern for hepato-renal syndrome. Continue vasopressor support. Strict I/Os. Monitor creatinine.  ID: Started empiric meropenem, vancomycin given clinical deterioration. Follow up ascitic fluid cultures.  HEME: DVT PPX with HSQ  SKIN: TLC and A-line placed today  FULL CODE  Updated patient's daughter and mother at bedside  Case discussed with [facility] liver transplant service - no plan for transfer at this time  Prognosis guarded

## 2021-11-29 NOTE — PROGRESS NOTE ADULT - ASSESSMENT
ASSESSMENT:  VALERIE NICHOLS is a 56y Female with history of     PLAN:    NEURO:  -Mental status: AMS 2/2 HE and possible UE.  -Sedation: None  -Pain control: None  -Ammonia 118 -->85 this AM. Repeat AM ammonia.  -Continue lactulose 30mg Q8 + rifamixin 550mg BID.    RESPIRATORY:   -Monitor pulse ox  -Previously tachypneic likely 2/2 large ascites causing decreased diaphragmatic excursion.  -SPO2 100% on 3L NC.    CARDIOVASCULAR:  -Telemetry  -Hypotensive in the setting of large volume ascites, low albumin, and 3rd spacing.  -Received 2500cc fluid in ED.  -s/p 500cc albumin yesterday. Continue 50cc albumin Q6.  -Goal MAP 65-75.  -Trend troponins. Likely elevated 2/2 demand ischemia.  -F/u TTE results.    GI/NUTRITION:  -Diet: NPO except meds  -GI ppx: None  -Bowel regimen: None - Continue lactulose (+rifamixin)  -CTAP results: Liver Cirrhosis. Previous hospital admission removed 12L on 11/12 at [place].  -Bedside paracentesis by Dr. White removed 3400ccs of serous fluid.  -F/u AM Ammonia level and trend daily.  -F/u AFP.  -F/u abdominal fluid cultures, cell count and differential.    GENITOURINARY/RENAL:  -Monitor electrolytes; replete PRN  -Monitor I/Os  -Making minimal urine today -> ~300ccs in 24 hrs despite multiple fluid boluses.  -Trend BUN/Cr. Cr on prior admission on 11/18 was 1.5 Today Cr. 4.3 on admission.  -Based on criteria, prerenal likely in setting of decreased renal perfusion 2/2 possible hepatorenal syndrome.  -Continue Octreotide for suspicion of hepatorenal syndrome.  -F/u renal recommendations.  -F/u UA, SAKINA, urine lytes.    HEMATOLOGIC:  -DVT ppx: Heparin 5000 SubQ Q8 + SCDs    INFECTIOUS DISEASE:  -Afebrile  -Empiric Rocephin for possible SBP.  -F/u paracentesis fluid cultures, cell count and differential.  -Trend AM Lactate and Procal.    ENDOCRINE:  No active issues.    CODE STATUS: Full Code    TUBES/LINES/DRAINS:  Barron  2 Peripheral IVs Upper Extremities Bilaterally ASSESSMENT:  VALERIE NICHOLS is a 56y Female with history of liver cirrhosis and HTN BIB niece for AMS and dyspnea. She is admitted for large volume ascites 2/2 cirrhosis and work-up of SBP and possible hepatorenal syndrome.     PLAN:    NEURO:  -Mental status: AMS 2/2 HE and possible UE.  -Sedation: None  -Pain control: None  -Ammonia 118 -->85 this AM. Repeat AM ammonia.  -Continue lactulose 30mg Q8 + rifamixin 550mg BID.    RESPIRATORY:   -Monitor pulse ox  -Previously tachypneic likely 2/2 large ascites causing decreased diaphragmatic excursion.  -SPO2 100% on 3L NC.    CARDIOVASCULAR:  -Telemetry  -Likely distributive shock in the setting of advanced liver cirrhosis with portal HTN.  -Received 2500cc fluid in ED.  -s/p 500cc albumin yesterday. Continue 50cc albumin Q6.  -Goal MAP 65-75.  -Trend troponins. Likely elevated 2/2 demand ischemia.  -LV EF 65%.    GI/NUTRITION:  -Diet: NPO except meds  -GI ppx: None  -Bowel regimen: None - Continue lactulose (+rifamixin)  -CTAP results: Liver Cirrhosis. Previous hospital admission removed 12L on 11/12 at [place].  -Bedside paracentesis by Dr. White removed 3400ccs of serous fluid.  -F/u AM Ammonia level and trend daily.  -F/u AFP.  -Abdominal Fluid cultures still pending. Cell count remarkable for 39 PMNs.  -GI consulted, f/u for recs.    GENITOURINARY/RENAL:  -Monitor electrolytes; replete PRN  -Monitor I/Os  -Making minimal urine today -> ~300ccs in 24 hrs despite multiple fluid boluses.  -Trend BUN/Cr. Cr on prior admission on 11/18 was 1.5 Today Cr. 4.3 on admission.  -Based on criteria, prerenal likely in setting of decreased renal perfusion 2/2 possible hepatorenal syndrome.  -Continue Octreotide for suspicion of hepatorenal syndrome.  -Nephro recs appreciated.   -SAKINA, urine lytes.    HEMATOLOGIC:  -DVT ppx: Heparin 5000 SubQ Q8 + SCDs    INFECTIOUS DISEASE:  -Afebrile  -Empiric Rocephin for possible SBP until cultures return. Doubt SBP as patient afebrile and PMNs of abdominal fluid 39.  -F/u paracentesis fluid cultures, cell count and differential.  -Trend AM Lactate and Procal.    ENDOCRINE:  No active issues.    CODE STATUS: Full Code    TUBES/LINES/DRAINS:  Barron  2 Peripheral IVs Upper Extremities Bilaterally

## 2021-11-29 NOTE — PROGRESS NOTE ADULT - PROBLEM SELECTOR PLAN 1
VIDHI on CKD 3  multifactorial: hepatorenal, abdominal compartment syndrome, hypovolemia, sepsis etc    -Cr improving 4.0 from 4.3 with Cr @1.5 on 11/18,   -c/w midodrine, albumin, octreotide and pressors per MICU  -s/p paracentesis 3.3L  -monitor bmp  -renal following

## 2021-11-29 NOTE — PROGRESS NOTE ADULT - PROBLEM SELECTOR PLAN 2
known cirrhosis with encephalopathy with elevated ammonia  c/w lactulose  consider adding rifaxmin if able  monitor cmp

## 2021-11-29 NOTE — CONSULT NOTE ADULT - ASSESSMENT
cirrhosis  hypotension  ? HRS  encephalopathy    plan  ngt  check ammonia level  give lactulose 30gr po bid  continue levophed  add xifaxamin 550mg po bid  in and out  ppi once a day  may need to be transferred to [facility] as patient is on the trasnplant list there  to follow up  d/w icu

## 2021-11-29 NOTE — PROGRESS NOTE ADULT - ASSESSMENT
55yo F with PMH of liver cirrhosis and HTN BIB niece for worsening altered mental status and dyspnea over the last 3 days admitted with metabolic encephalopathy due to sepsis, ARF and hepatic encephalopathy

## 2021-11-29 NOTE — PROCEDURE NOTE - NSINDICATIONS_GEN_A_CORE
critical patient/monitoring purposes
venous access
Cirrhosis/abdominal compartment syndrome/anuria/oliguria/ascites
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
## 2021-11-29 NOTE — PROCEDURAL SAFETY CHECKLIST WITH OR WITHOUT SEDATION - NSPREVERIFYSED_GEN_ALL_CORE
----- Message from Devendra Tian MD sent at 2/24/2021 10:26 PM EST -----  Please inform the patient that all of his recent laboratory studies including blood count, thyroid function, inflammatory markers were all normal       Will see patient at upcoming endoscop
Lm for pt to call office back regarding results 
Pt aware of results, verbalized understanding  Advised to call office with any questions or concerns 
Pt returning call for results
done

## 2021-11-29 NOTE — PROGRESS NOTE ADULT - SUBJECTIVE AND OBJECTIVE BOX
Patient is a 56y old  Female who presents with a chief complaint of acute renal failure/hepatic encephalopathy (2021 14:32)    Patient seen in follow up for VIDHI.        PAST MEDICAL HISTORY:  History of cirrhosis of liver    ETOH abuse    Thyroid goiter      MEDICATIONS  (STANDING):  albumin human 25% IVPB 50 milliLiter(s) IV Intermittent every 6 hours  cefTRIAXone   IVPB 1000 milliGRAM(s) IV Intermittent every 24 hours  chlorhexidine 2% Cloths 1 Application(s) Topical <User Schedule>  dexMEDEtomidine Infusion 0.006 MICROgram(s)/kG/Hr (0.1 mL/Hr) IV Continuous <Continuous>  heparin   Injectable 5000 Unit(s) SubCutaneous every 8 hours  lactulose Syrup 30 Gram(s) Oral every 8 hours  melatonin 5 milliGRAM(s) Oral at bedtime  midodrine 5 milliGRAM(s) Oral every 8 hours  norepinephrine Infusion 0.05 MICROgram(s)/kG/Min (6.25 mL/Hr) IV Continuous <Continuous>  octreotide  Injectable 100 MICROGram(s) SubCutaneous every 6 hours  rifAXIMin 550 milliGRAM(s) Oral two times a day    MEDICATIONS  (PRN):    T(C): 32.9 (21 @ 11:00), Max: 36.1 (21 @ 12:00)  HR: 68 (21 @ 11:00) (60 - 82)  BP: 90/53 (21 @ 11:00) (77/52 - 118/67)  RR: 27 (21 @ 11:00) (12 - 58)  SpO2: 99% (21 @ 11:00) (75% - 100%)  Wt(kg): --  I&O's Detail    2021 07:01  -  2021 07:00  --------------------------------------------------------  IN:    Albumin 25%  -  50 mL: 200 mL    Albumin 5%  - 250 mL: 500 mL    Dexmedetomidine: 102.6 mL    IV PiggyBack: 50 mL    Norepinephrine: 504.5 mL  Total IN: 1357.1 mL    OUT:    Indwelling Catheter - Urethral (mL): 395 mL    Norepinephrine: 0 mL  Total OUT: 395 mL    Total NET: 962.1 mL      2021 07:01  -  2021 12:36  --------------------------------------------------------  IN:    Albumin 25%  -  50 mL: 50 mL    Dexmedetomidine: 15.2 mL    Norepinephrine: 115.4 mL  Total IN: 180.6 mL    OUT:    Indwelling Catheter - Urethral (mL): 75 mL  Total OUT: 75 mL    Total NET: 105.6 mL          PHYSICAL EXAM:  General: No distress  Respiratory: b/l air entry  Cardiovascular: S1 S2  Gastrointestinal: soft, distended  Extremities:  edema                              8.5    10.54 )-----------( 153      ( 2021 06:52 )             24.3         132<L>  |  104  |  88<H>  ----------------------------<  101<H>  4.4   |  13<L>  |  4.00<H>    Ca    9.7      2021 06:52  Phos  8.0       Mg     2.4         TPro  6.4  /  Alb  3.1<L>  /  TBili  1.8<H>  /  DBili  x   /  AST  83<H>  /  ALT  37  /  AlkPhos  92      CARDIAC MARKERS ( 2021 18:56 )  x     / x     / 84 U/L / x     / 10.0 ng/mL      LIVER FUNCTIONS - ( 2021 06:52 )  Alb: 3.1 g/dL / Pro: 6.4 g/dL / ALK PHOS: 92 U/L / ALT: 37 U/L / AST: 83 U/L / GGT: x           Urinalysis Basic - ( 2021 01:23 )    Color: Yellow / Appearance: Slightly Turbid / S.015 / pH: x  Gluc: x / Ketone: Trace  / Bili: Negative / Urobili: Negative   Blood: x / Protein: 30 mg/dL / Nitrite: Negative   Leuk Esterase: Trace / RBC: 11-25 /HPF / WBC 0-2   Sq Epi: x / Non Sq Epi: Few / Bacteria: Occasional      ABG - ( 2021 06:10 )  pH, Arterial: 7.22  pH, Blood: x     /  pCO2: 26    /  pO2: 114   / HCO3: 11    / Base Excess: -17.1 /  SaO2: 99.7              Sodium, Serum: 132 ( @ 06:52)  Sodium, Serum: 129 ( @ 06:53)  Sodium, Serum: 132 ( @ 18:56)    Creatinine, Serum: 4.00 ( @ 06:52)  Creatinine, Serum: 4.00 ( @ 06:53)  Creatinine, Serum: 4.30 ( @ 18:56)    Potassium, Serum: 4.4 ( @ 06:52)  Potassium, Serum: 4.4 ( @ 06:53)  Potassium, Serum: 4.6 ( @ 18:56)    Hemoglobin: 8.5 ( @ 06:52)  Hemoglobin: 9.9 ( @ 06:53)  Hemoglobin: 10.3 ( @ 18:56)

## 2021-11-29 NOTE — PROGRESS NOTE ADULT - SUBJECTIVE AND OBJECTIVE BOX
Interval events: Pt seen and examined at bedside this morning. No new overnight events. Unable to obtain ROS as patient is encephalopathic, however patient is in less pain as compared with yesterday as she is s/p 3.5L paracentesis.    Review of Systems:  Unable to obtain ROS as patient encephalopathic.    T(F): 93.9 (21 @ 13:00), Max: 97 (21 @ 23:00)  HR: 80 (21 @ 13:00) (62 - 82)  BP: 102/60 (21 @ 13:00) (77/52 - 118/67)  RR: 22 (21 @ 13:00) (14 - 58)  SpO2: 95% (21 @ 13:00) (75% - 100%)  Wt(kg): --      21 @ 07:01  -  21 @ 07:00  --------------------------------------------------------  IN: 1357.1 mL / OUT: 395 mL / NET: 962.1 mL    21 @ 07:  -  21 @ 13:52  --------------------------------------------------------  IN: 276.3 mL / OUT: 75 mL / NET: 201.3 mL        CAPILLARY BLOOD GLUCOSE      POCT Blood Glucose.: 85 mg/dL (2021 18:13)      I&O's Summary    2021 07:  -  2021 07:00  --------------------------------------------------------  IN: 1357.1 mL / OUT: 395 mL / NET: 962.1 mL    2021 07:01  -  2021 13:52  --------------------------------------------------------  IN: 276.3 mL / OUT: 75 mL / NET: 201.3 mL        Physical Exam:   Gen: Lethargic and ill appearing. Appears older than stated age.  Neuro: Encephalopathic and minimally arousable to sternal rub.  HEENT: PERRLA, NCAT.  Resp: CTA b/l. No wheezes, rales or rhonchi.   CVS: S1, S2. RRR  Abd: Protuberant. Tympanic to percussion. Bowel Sounds appreciated.  Ext: +2 DP pulses appreciated.    Meds:  cefTRIAXone   IVPB IV Intermittent  rifAXIMin Oral    norepinephrine Infusion IV Continuous    octreotide  Injectable SubCutaneous      dexMEDEtomidine Infusion IV Continuous  melatonin Oral      heparin   Injectable SubCutaneous    lactulose Syrup Oral      albumin human 25% IVPB IV Intermittent      chlorhexidine 2% Cloths Topical                              8.5    10.54 )-----------( 153      ( 2021 06:52 )             24.3           132<L>  |  104  |  88<H>  ----------------------------<  101<H>  4.4   |  13<L>  |  4.00<H>    Ca    9.7      2021 06:52  Phos  8.0       Mg     2.4         TPro  6.4  /  Alb  3.1<L>  /  TBili  1.8<H>  /  DBili  x   /  AST  83<H>  /  ALT  37  /  AlkPhos  92      Lactate 2.2            @ 06:45      CARDIAC MARKERS ( 2021 18:56 )  x     / x     / 84 U/L / x     / 10.0 ng/mL      PT/INR - ( 2021 06:52 )   PT: 23.3 sec;   INR: 2.06 ratio         PTT - ( 2021 18:56 )  PTT:38.9 sec  Urinalysis Basic - ( 2021 01:23 )    Color: Yellow / Appearance: Slightly Turbid / S.015 / pH: x  Gluc: x / Ketone: Trace  / Bili: Negative / Urobili: Negative   Blood: x / Protein: 30 mg/dL / Nitrite: Negative   Leuk Esterase: Trace / RBC: 11-25 /HPF / WBC 0-2   Sq Epi: x / Non Sq Epi: Few / Bacteria: Occasional      .Body Fluid Peritoneal Fluid   Testing in progress   polymorphonuclear leukocytes seen  No organisms seen  by cytocentrifuge  @ 19:10  .Blood Blood-Peripheral   No growth to date. --  @ 01:00      Rapid RVP Result: NotDetec ( @ 18:56)    Tubes/lines:  Barron Catheter  A-Line      GLOBAL ISSUE/BEST PRACTICE:  Analgesia: None  Sedation: None  HOB elevation: yes  Stress ulcer prophylaxis:  VTE prophylaxis: Heparin 5000 SubQ Q8  Glycemic control: None  Nutrition: NPO except meds    CODE STATUS: Full Code

## 2021-11-30 NOTE — PROGRESS NOTE ADULT - PROBLEM SELECTOR PLAN 1
VIDHI on CKD 3  multifactorial: hepatorenal, abdominal compartment syndrome, hypovolemia, sepsis etc    -Cr stable 4.1 from 4.3 with Cr baseline @1.5 on 11/18,   -c/w midodrine, albumin, octreotide and pressors per MICU  -s/p paracentesis 3.3L  -monitor bmp  -renal following

## 2021-11-30 NOTE — PROGRESS NOTE ADULT - ASSESSMENT
ASSESSMENT:  VALERIE NICHOLS is a 56y Female with history of liver cirrhosis and HTN BIB niece for AMS and dyspnea. She is admitted for large volume ascites 2/2 cirrhosis and work-up of SBP and possible hepatorenal syndrome.     PLAN:    NEURO:  -Mental status: AMS 2/2 HE and possible UE.  -Sedation: None  -Pain control: None  -Ammonia 118 -->85 this AM. Repeat AM ammonia.  -Continue lactulose 30mg Q8 + rifamixin 550mg BID.    RESPIRATORY:   -Monitor pulse ox  -Previously tachypneic likely 2/2 large ascites causing decreased diaphragmatic excursion.  -SPO2 100% on 3L NC.    CARDIOVASCULAR:  -Telemetry  -Likely distributive shock in the setting of advanced liver cirrhosis with portal HTN.  -Received 2500cc fluid in ED.  -s/p 500cc albumin yesterday. Continue 50cc albumin Q6.  -Goal MAP 65-75.  -Trend troponins. Likely elevated 2/2 demand ischemia.  -LV EF 65%.    GI/NUTRITION:  -Diet: NPO except meds  -GI ppx: None  -Bowel regimen: None - Continue lactulose (+rifamixin)  -CTAP results: Liver Cirrhosis. Previous hospital admission removed 12L on 11/12 at [place].  -Bedside paracentesis by Dr. White removed 3400ccs of serous fluid.  -F/u AM Ammonia level and trend daily.  -F/u AFP.  -Abdominal Fluid cultures still pending. Cell count remarkable for 39 PMNs.  -GI consulted, f/u for recs.    GENITOURINARY/RENAL:  -Monitor electrolytes; replete PRN  -Monitor I/Os  -Making minimal urine today -> ~300ccs in 24 hrs despite multiple fluid boluses.  -Trend BUN/Cr. Cr on prior admission on 11/18 was 1.5 Today Cr. 4.3 on admission.  -Based on criteria, prerenal likely in setting of decreased renal perfusion 2/2 possible hepatorenal syndrome.  -Continue Octreotide for suspicion of hepatorenal syndrome.  -Nephro recs appreciated.   -SAKINA, urine lytes.    HEMATOLOGIC:  -DVT ppx: Heparin 5000 SubQ Q8 + SCDs    INFECTIOUS DISEASE:  -Afebrile  -Empiric Rocephin for possible SBP until cultures return. Doubt SBP as patient afebrile and PMNs of abdominal fluid 39.  -F/u paracentesis fluid cultures, cell count and differential.  -Trend AM Lactate and Procal.    ENDOCRINE:  No active issues.    CODE STATUS: Full Code    TUBES/LINES/DRAINS:  Barron  2 Peripheral IVs Upper Extremities Bilaterally ASSESSMENT:  VALERIE NICHOLS is a 56y Female with history of liver cirrhosis and HTN BIB niece for AMS and dyspnea. She is admitted for large volume ascites 2/2 cirrhosis and work-up of SBP and possible hepatorenal syndrome.     PLAN:    NEURO:  -Mental status: AMS 2/2 HE and possible UE.  -Sedation: Continue Precedex  -Pain control: Precedex  -Trend Ammonia.  -Continue lactulose 30mg Q8 + rifamixin 550mg BID.    RESPIRATORY:   -Monitor pulse ox  -Previously tachypneic likely 2/2 large ascites causing decreased diaphragmatic excursion.  -SPO2 92% on NRB 15L.  -CXR wet read reveals bilateraly interstitial infiltrates.    CARDIOVASCULAR:  -Telemetry  -Likely distributive shock in the setting of advanced liver cirrhosis with portal HTN. Continue levophed.  -Continue 50cc albumin Q6.  -Goal MAP 65-75.  -LV EF 65%.    GI/NUTRITION:  -Diet: NPO except meds  -GI ppx: None  -Bowel regimen: None - Continue lactulose (+rifamixin)  -CTAP results: Liver Cirrhosis. Previous hospital admission removed 12L on 11/12 at [place].  -Bedside paracentesis by Dr. White removed 3400ccs of serous fluid.  -F/u AM Ammonia level and trend daily.  -F/u AFP.  -Abdominal Fluid cultures still pending. Cell count remarkable for 39 PMNs.  -GI consulted, f/u for recs.    GENITOURINARY/RENAL:  -Monitor electrolytes; replete PRN  -Monitor I/Os. Minimal UOP.  -Trend BUN/Cr. Cr on prior admission on 11/18 was 1.5 Today Cr. 4.3 on admission.  -Based on criteria, prerenal likely in setting of decreased renal perfusion 2/2 possible hepatorenal syndrome.  -Continue Octreotide for suspicion of hepatorenal syndrome.  -Nephro recs appreciated.   -F/u SAKINA, urine lytes.    HEMATOLOGIC:  -DVT ppx: Heparin 5000 SubQ Q8 + SCDs    INFECTIOUS DISEASE:  -Afebrile  -Empiric merrem + Vanc.  -F/u paracentesis fluid cultures, cell count and differential.  -Trend AM Lactate and Procal.    ENDOCRINE:  No active issues.    CODE STATUS: Full Code    TUBES/LINES/DRAINS:  Barron  2 Peripheral IVs Upper Extremities Bilaterally ASSESSMENT:  VALERIE NICHOLS is a 56y Female with history of liver cirrhosis and HTN BIB niece for AMS and dyspnea. She is admitted for large volume ascites 2/2 cirrhosis and work-up of SBP and possible hepatorenal syndrome.     PLAN:    NEURO:  -Mental status: AMS 2/2 HE and possible UE.  -Sedation: Continue Precedex  -Pain control: Precedex  -Trend Ammonia.  -Continue lactulose 30mg Q8 + rifamixin 550mg BID.    RESPIRATORY:   -Monitor pulse ox  -Previously tachypneic likely 2/2 large ascites causing decreased diaphragmatic excursion.  -SPO2 92% on NRB 15L.  -CXR wet read reveals bilateraly interstitial infiltrates.  -s/p IV Lasix 80mg x 1.    CARDIOVASCULAR:  -Telemetry  -Likely distributive shock in the setting of advanced liver cirrhosis with portal HTN. Continue levophed.  -Continue 50cc albumin Q6.  -Goal MAP 65-75.  -LV EF 65%.    GI/NUTRITION:  -Diet: NPO except meds  -GI ppx: None  -Bowel regimen: None - Continue lactulose (+rifamixin)  -CTAP results: Liver Cirrhosis. Previous hospital admission removed 12L on 11/12 at [place] [facility].  -Bedside paracentesis by Dr. White removed 3400ccs of serous fluid.  -F/u AM Ammonia level and trend daily.  -F/u AFP.  -Abdominal Fluid cultures still pending. Cell count remarkable for 39 PMNs.  -GI consulted, f/u for recs.    GENITOURINARY/RENAL:  -Monitor electrolytes; replete PRN  -Monitor I/Os. Minimal UOP.  -Trend BUN/Cr. Cr on prior admission on 11/18 was 1.5 Today Cr. 4.3 on admission.  -Based on criteria, prerenal likely in setting of decreased renal perfusion 2/2 possible hepatorenal syndrome.  -Continue Octreotide for suspicion of hepatorenal syndrome.  -Nephro recs appreciated.   -F/u SAKINA, urine lytes.    HEMATOLOGIC:  -DVT ppx: Heparin 5000 SubQ Q8 + SCDs    INFECTIOUS DISEASE:  -Afebrile  -Empiric merrem + Vanc.  -F/u paracentesis fluid cultures, cell count and differential.  -Trend AM Lactate and Procal.    ENDOCRINE:  No active issues.    CODE STATUS: Full Code    TUBES/LINES/DRAINS:  Barron  2 Peripheral IVs Upper Extremities Bilaterally

## 2021-11-30 NOTE — PROGRESS NOTE ADULT - PROBLEM SELECTOR PLAN 4
Ammonia elevated @ 118 with elevated LFTs  CT abd/pelv: liver cirrhosis, large ascites Colitis vs portal colopathy. Groundglass and dependent subsegmental atelectasis of lungs. No pleural effusion.   Hold home lasix and spironolactone given hypotension  fluid removal w/ paracentesis; s/p paracentesis 11/28, negative for SBP  On empiric antibiotics
Meets SIRS criteria with RR of 22 and HR of 105  CT abd/pelvis: liver cirrhosis, large ascites. Colitis vs portal colopathy. Groundglass and dependent subsegmental atelectasis of lungs. No pleural effusion.   S/p Levofloxacin x1, 500mL NS bolus x2  Started IV Rocephin, Given 1L LR bolus  Diuretics on hold for now due to hypotension/possible sepsis  Serum Lactate 3.0, f/u repeat lactate   F/u blood and urine cultures  F/u UA  Medical management as per MICU recommendations
suspect SIRS vs septic ? due to colitis vs other occult infection  CT abd/pelvis: liver cirrhosis, large ascites. Colitis vs portal colopathy. Groundglass and dependent subsegmental atelectasis of lungs. No pleural effusion.   bp low 80s  c/w antibiotics and pressors per MICU  blood and urine cultures NGTD  lactate 2.2 downtrending  IVF prn

## 2021-11-30 NOTE — DISCHARGE NOTE FOR THE EXPIRED PATIENT - HOSPITAL COURSE
56 F with history of EtOH abuse c/b liver cirrhosis, hepatic encephalopathy, portal hypertension, and ascites now presents with acute decompensated cirrhosis and hepatic encephalopathy with associated shock, lactic acidosis, and acute kidney injury / hepatorenal syndrome. There was concern for abdominal compartment syndrome over the weekend s/p therapeutic paracentesis 11/28 with drainage of 3.4 liters. Patient remained in multisystem organ failure with worsening shock state and developed acute hypoxemic respiratory failure. Discussed case with patient's mother at bedside. Decision made to change code status to DNR/DNI and pursue comfort measures. Vasopressors were discontinued. Dilaudid was given as needed for dyspnea. Patient passed away with family at bedside.

## 2021-11-30 NOTE — PROGRESS NOTE ADULT - PROBLEM SELECTOR PLAN 3
sepsis occult source  CT abd/pelvis: liver cirrhosis, large ascites. Colitis vs portal colopathy. Groundglass and dependent subsegmental atelectasis of lungs. No pleural effusion.   blood cx and urine cx NGTD  ascites cx NGTD  c/w meropenem and pressors per MICU  blood and urine cultures NGTD  lactate 2.5 today  ID following  send MRSA nasal pcr  suspect tachypenia may be due to fluid overload, c/w NRBM, care per SHANA

## 2021-11-30 NOTE — PHARMACOTHERAPY INTERVENTION NOTE - COMMENTS
Patient is a 56 year old female ordered for meropenem 1000mg q8 hours for empiric coverage. Medication needs to be renally adjusted given the patient’s eGFR of 12. Discussed dosing with BINH Mayorga and suggested decreasing the dose to 500mg BID, NP agreed and ordered
Patient ordered octreotide for hepatorenal syndrome, discussed dosing with ICU Dr Lang, recommended 3x daily dosing instead of q6hr; MD agreed

## 2021-11-30 NOTE — PROGRESS NOTE ADULT - PROBLEM SELECTOR PLAN 5
Troponin 268, CKMB 10, CPK 11.9 on admission, likely 2/2 demand ischemia from shock and renal insufficiency  F/u repeat Yvonne  Consider TTE and cardio consult
downtrending trops  - likely 2/2 demand ischemia from shock and renal insufficiency  -Consider TTE
downtrending trops  - likely 2/2 demand ischemia from shock and renal insufficiency  -Consider TTE

## 2021-11-30 NOTE — PROGRESS NOTE ADULT - PROBLEM SELECTOR PROBLEM 4
Liver cirrhosis
SIRS (systemic inflammatory response syndrome)
SIRS (systemic inflammatory response syndrome)

## 2021-11-30 NOTE — PROGRESS NOTE ADULT - PROVIDER SPECIALTY LIST ADULT
Critical Care
Critical Care
Nephrology
Nephrology
Critical Care
Gastroenterology
Critical Care
Hospitalist

## 2021-11-30 NOTE — CONSULT NOTE ADULT - REASON FOR ADMISSION
acute renal failure/hepatic encephalopathy

## 2021-11-30 NOTE — PROGRESS NOTE ADULT - SUBJECTIVE AND OBJECTIVE BOX
Patient is a 56y old  Female who presents with a chief complaint of acute renal failure/hepatic encephalopathy (28 Nov 2021 14:32)    Patient seen in follow up for VIDHI.        PAST MEDICAL HISTORY:  History of cirrhosis of liver    ETOH abuse    Thyroid goiter      MEDICATIONS  (STANDING):  albumin human 25% IVPB 50 milliLiter(s) IV Intermittent every 6 hours  chlorhexidine 2% Cloths 1 Application(s) Topical <User Schedule>  dexMEDEtomidine Infusion 0.006 MICROgram(s)/kG/Hr (0.1 mL/Hr) IV Continuous <Continuous>  heparin   Injectable 5000 Unit(s) SubCutaneous every 8 hours  lactulose Syrup 30 Gram(s) Oral every 8 hours  melatonin 5 milliGRAM(s) Oral at bedtime  meropenem  IVPB      meropenem  IVPB 500 milliGRAM(s) IV Intermittent every 12 hours  norepinephrine Infusion 0.05 MICROgram(s)/kG/Min (6.25 mL/Hr) IV Continuous <Continuous>  octreotide  Injectable 100 MICROGram(s) SubCutaneous every 6 hours  rifAXIMin 550 milliGRAM(s) Oral two times a day  vasopressin Infusion 0.04 Unit(s)/Min (2.4 mL/Hr) IV Continuous <Continuous>    MEDICATIONS  (PRN):    T(C): 37.5 (11-30-21 @ 08:00), Max: 37.5 (11-30-21 @ 08:00)  HR: 88 (11-30-21 @ 10:00) (62 - 102)  BP: 94/54 (11-30-21 @ 08:00) (77/52 - 118/67)  RR: 21 (11-30-21 @ 10:00)  SpO2: 85% (11-30-21 @ 10:00)  Wt(kg): --  I&O's Detail    29 Nov 2021 07:01  -  30 Nov 2021 07:00  --------------------------------------------------------  IN:    Albumin 25%  -  50 mL: 150 mL    Albumin 5%  - 250 mL: 50 mL    Dexmedetomidine: 16.9 mL    IV PiggyBack: 50 mL    Norepinephrine: 1184.8 mL    Vasopressin: 33.6 mL  Total IN: 1485.3 mL    OUT:    Indwelling Catheter - Urethral (mL): 315 mL  Total OUT: 315 mL    Total NET: 1170.3 mL              PHYSICAL EXAM:  General: No distress  Respiratory: b/l air entry  Cardiovascular: S1 S2  Gastrointestinal: soft, distended  Extremities:  edema                             LABORATORY:                        8.0    11.95 )-----------( 140      ( 30 Nov 2021 05:53 )             22.7     11-30    135  |  107  |  90<H>  ----------------------------<  102<H>  4.6   |  13<L>  |  4.10<H>    Ca    9.1      30 Nov 2021 05:53  Phos  7.8     11-30  Mg     2.7     11-30    TPro  5.7<L>  /  Alb  3.1<L>  /  TBili  1.5<H>  /  DBili  x   /  AST  84<H>  /  ALT  34  /  AlkPhos  76  11-30    Sodium, Serum: 135 mmol/L (11-30 @ 05:53)  Sodium, Serum: 132 mmol/L (11-29 @ 06:52)    Potassium, Serum: 4.6 mmol/L (11-30 @ 05:53)  Potassium, Serum: 4.4 mmol/L (11-29 @ 06:52)    Hemoglobin: 8.0 g/dL (11-30 @ 05:53)  Hemoglobin: 8.5 g/dL (11-29 @ 06:52)  Hemoglobin: 9.9 g/dL (11-28 @ 06:53)  Hemoglobin: 10.3 g/dL (11-27 @ 18:56)    Creatinine, Serum 4.10 (11-30 @ 05:53)  Creatinine, Serum 4.00 (11-29 @ 06:52)  Creatinine, Serum 4.00 (11-28 @ 06:53)  Creatinine, Serum 4.30 (11-27 @ 18:56)        LIVER FUNCTIONS - ( 30 Nov 2021 05:53 )  Alb: 3.1 g/dL / Pro: 5.7 g/dL / ALK PHOS: 76 U/L / ALT: 34 U/L / AST: 84 U/L / GGT: x             ABG - ( 29 Nov 2021 06:10 )  pH, Arterial: 7.22  pH, Blood: x     /  pCO2: 26    /  pO2: 114   / HCO3: 11    / Base Excess: -17.1 /  SaO2: 99.7

## 2021-11-30 NOTE — PROGRESS NOTE ADULT - ASSESSMENT
VIDHI: Likely multifactorial: Prerenal azotemia, R/o HRS, ? Tamponade effect, Hemodynamic ATN  h/o Alcoholic cirrhosis  Hyponatremia  Metabolic acidosis  Anemia  Hepatic encephalopathy  Ascites, s/p paracenetesis 11/28  Hypotension    Stable renal indcies but not at baseline. Pt oliguirc. To start tube feeds. IV lasix PRN. On pressor support. Monitor urine out put. On Octreotide, midodrine and albumin.   Rx for hepatic encephalopathy. Monitor h/h trend. Avoid nephrotoxic meds as possible. Avoid ACEI, ARB, NSAIDs and IV contrast.   Will follow electrolytes and renal function trend. Overall prognosis poor. D/w ICU team.

## 2021-11-30 NOTE — PROGRESS NOTE ADULT - PROBLEM SELECTOR PLAN 7
Chronic  Hold home meds given hypotension  Monitor hemodynamics

## 2021-11-30 NOTE — PROGRESS NOTE ADULT - ASSESSMENT
cirrhosis  sepsis  mof  hrs  encephalopathy    plan  in and out  ppi once a day   check ammonia level  continue xifaxamin and lactulsoe  d/w icu to follow up    Advanced care planning was discussed with patient and family.  Advanced care planning forms were reviewed and discussed.  Risks, benefits and alternatives of gastroenterologic procedures were discussed in detail and all questions were answered.    30 minutes spent.

## 2021-11-30 NOTE — PROGRESS NOTE ADULT - SUBJECTIVE AND OBJECTIVE BOX
Patient is a 56y old  Female who presents with a chief complaint of acute renal failure/hepatic encephalopathy (30 Nov 2021 10:39)      SUBJECTIVE / OVERNIGHT EVENTS: No On events.       ADDITIONAL REVIEW OF SYSTEMS: Negative except for above    MEDICATIONS  (STANDING):  albumin human 25% IVPB 50 milliLiter(s) IV Intermittent every 6 hours  chlorhexidine 2% Cloths 1 Application(s) Topical <User Schedule>  dexMEDEtomidine Infusion 0.006 MICROgram(s)/kG/Hr (0.1 mL/Hr) IV Continuous <Continuous>  heparin   Injectable 5000 Unit(s) SubCutaneous every 8 hours  lactulose Syrup 30 Gram(s) Oral every 8 hours  melatonin 5 milliGRAM(s) Oral at bedtime  meropenem  IVPB      meropenem  IVPB 500 milliGRAM(s) IV Intermittent every 12 hours  norepinephrine Infusion 0.05 MICROgram(s)/kG/Min (6.25 mL/Hr) IV Continuous <Continuous>  octreotide  Injectable 100 MICROGram(s) SubCutaneous every 8 hours  rifAXIMin 550 milliGRAM(s) Oral two times a day  vasopressin Infusion 0.04 Unit(s)/Min (2.4 mL/Hr) IV Continuous <Continuous>    MEDICATIONS  (PRN):      CAPILLARY BLOOD GLUCOSE        I&O's Summary    29 Nov 2021 07:01  -  30 Nov 2021 07:00  --------------------------------------------------------  IN: 1485.3 mL / OUT: 315 mL / NET: 1170.3 mL    30 Nov 2021 07:01  -  30 Nov 2021 13:56  --------------------------------------------------------  IN: 202.4 mL / OUT: 0 mL / NET: 202.4 mL        PHYSICAL EXAM:  Vital Signs Last 24 Hrs  T(C): 36.5 (30 Nov 2021 12:30), Max: 37.5 (30 Nov 2021 08:00)  T(F): 97.7 (30 Nov 2021 12:30), Max: 99.5 (30 Nov 2021 08:00)  HR: 90 (30 Nov 2021 12:00) (77 - 102)  BP: 108/58 (30 Nov 2021 12:00) (89/55 - 110/60)  BP(mean): 79 (30 Nov 2021 12:00) (65 - 79)  RR: 34 (30 Nov 2021 12:00) (15 - 41)  SpO2: 97% (30 Nov 2021 12:00) (85% - 97%)    PHYSICAL EXAM:  GENERAL: tachypneic obtunded on NRMB  HEAD:  Atraumatic, Normocephali  HEART: Regular rate and rhythm;   ABDOMEN: Soft, Nontender, distended;  EXTREMITIES:  +edema b/l         LABS:                        8.0    11.95 )-----------( 140      ( 30 Nov 2021 05:53 )             22.7     11-30    135  |  107  |  90<H>  ----------------------------<  102<H>  4.6   |  13<L>  |  4.10<H>    Ca    9.1      30 Nov 2021 05:53  Phos  7.8     11-30  Mg     2.7     11-30    TPro  5.7<L>  /  Alb  3.1<L>  /  TBili  1.5<H>  /  DBili  x   /  AST  84<H>  /  ALT  34  /  AlkPhos  76  11-30    PT/INR - ( 30 Nov 2021 05:53 )   PT: 28.5 sec;   INR: 2.55 ratio         PTT - ( 30 Nov 2021 05:53 )  PTT:72.6 sec          Culture - Urine (collected 29 Nov 2021 18:28)  Source: Clean Catch Clean Catch (Midstream)  Final Report (30 Nov 2021 13:27):    No growth    Culture - Acid Fast - Body Fluid w/Smear (collected 28 Nov 2021 19:10)  Source: .Body Fluid Ascites Fluid    Culture - Fungal, Body Fluid (collected 28 Nov 2021 19:10)  Source: .Body Fluid Peritoneal Fluid  Preliminary Report (29 Nov 2021 11:35):    Testing in progress    Culture - Body Fluid with Gram Stain (collected 28 Nov 2021 19:10)  Source: .Body Fluid Peritoneal Fluid  Gram Stain (28 Nov 2021 21:26):    polymorphonuclear leukocytes seen    No organisms seen    by cytocentrifuge  Preliminary Report (29 Nov 2021 18:41):    No growth    Culture - Blood (collected 28 Nov 2021 01:00)  Source: .Blood Blood-Peripheral  Preliminary Report (29 Nov 2021 01:01):    No growth to date.    Culture - Blood (collected 28 Nov 2021 01:00)  Source: .Blood Blood-Peripheral  Preliminary Report (29 Nov 2021 01:01):    No growth to date.        RADIOLOGY & ADDITIONAL TESTS:    Imaging Personally Reviewed:    Electrocardiogram Personally Reviewed:    COORDINATION OF CARE:  Care Discussed with Consultants/Other Providers [Y/N]:  Prior or Outpatient Records Reviewed [Y/N]:

## 2021-11-30 NOTE — CHART NOTE - NSCHARTNOTEFT_GEN_A_CORE
Family meeting at bedside with patient's mother Viviane Echeverria. Following discussion with her family, decision was made to pursue comfort measures. Will discontinue vasopressor support, antibiotics, and other medications that do not address her comfort. Will give hydromorphone for dyspnea, glyccopyrolate for secretions. Remains on dexmedetomidine gtt.

## 2021-11-30 NOTE — PROGRESS NOTE ADULT - PROBLEM SELECTOR PLAN 6
Hgb 10.3, Hct 28.8 on admission  Likely 2/2 liver pathology  F/u AM labs  Transfuse if Hgb <7
hgb slowly downtrending 8s from 10s, no s/s of bleeding  Likely 2/2 liver pathology, aOCD  monitor cbc  monitor for bleed  Transfuse if Hgb <7
hgb slowly downtrending 8s from 10s, no s/s of bleeding  Likely 2/2 liver pathology, aOCD  monitor cbc  monitor for bleed  Transfuse if Hgb <7

## 2021-11-30 NOTE — PROGRESS NOTE ADULT - PROBLEM SELECTOR PLAN 2
known cirrhosis with encephalopathy with elevated ammonia and ARF  c/w lactulose  c/w rifaximin bid  monitor cmp  treat ARF as above

## 2021-11-30 NOTE — PROGRESS NOTE ADULT - SUBJECTIVE AND OBJECTIVE BOX
Interval events: Pt seen and examined at bedside this morning. Overnight pt was desaturating on NC and had been switched to non-rebreather mask to 15L O2. She has also become increasingly agitated and needed to be restarted on precedex. Pt was in active respiratory distress and CXR    Review of Systems:  Constitutional: No fever, chills, fatigue  Neuro: No headache, numbness, weakness  Resp: No cough, wheezing, shortness of breath  CVS: No chest pain, palpitations, leg swelling  GI: No abdominal pain, nausea, vomiting, diarrhea   : No dysuria, frequency, incontinence  Skin: No itching, burning, rashes, or lesions   Msk: No joint pain or swelling  Psych: No depression, anxiety, mood swings    T(F): 97.7 (11-30-21 @ 12:30), Max: 99.5 (11-30-21 @ 08:00)  HR: 84 (11-30-21 @ 14:00) (83 - 102)  BP: 99/64 (11-30-21 @ 14:00) (89/55 - 110/60)  RR: 38 (11-30-21 @ 14:00) (15 - 42)  SpO2: 92% (11-30-21 @ 14:00) (85% - 97%)  Wt(kg): --      11-29-21 @ 07:01  -  11-30-21 @ 07:00  --------------------------------------------------------  IN: 1485.3 mL / OUT: 315 mL / NET: 1170.3 mL    11-30-21 @ 07:01  -  11-30-21 @ 14:17  --------------------------------------------------------  IN: 478 mL / OUT: 0 mL / NET: 478 mL        CAPILLARY BLOOD GLUCOSE          I&O's Summary    29 Nov 2021 07:01  -  30 Nov 2021 07:00  --------------------------------------------------------  IN: 1485.3 mL / OUT: 315 mL / NET: 1170.3 mL    30 Nov 2021 07:01  -  30 Nov 2021 14:17  --------------------------------------------------------  IN: 478 mL / OUT: 0 mL / NET: 478 mL        Physical Exam:   Gen:  Neuro:  HEENT:  Resp:  CVS:  Abd:  Ext:  Skin:    Meds:  meropenem  IVPB   meropenem  IVPB IV Intermittent  rifAXIMin Oral    norepinephrine Infusion IV Continuous    octreotide  Injectable SubCutaneous  vasopressin Infusion IV Continuous      dexMEDEtomidine Infusion IV Continuous  melatonin Oral      heparin   Injectable SubCutaneous    lactulose Syrup Oral      albumin human 25% IVPB IV Intermittent      chlorhexidine 2% Cloths Topical                              8.0    11.95 )-----------( 140      ( 30 Nov 2021 05:53 )             22.7       11-30    135  |  107  |  90<H>  ----------------------------<  102<H>  4.6   |  13<L>  |  4.10<H>    Ca    9.1      30 Nov 2021 05:53  Phos  7.8     11-30  Mg     2.7     11-30    TPro  5.7<L>  /  Alb  3.1<L>  /  TBili  1.5<H>  /  DBili  x   /  AST  84<H>  /  ALT  34  /  AlkPhos  76  11-30    Lactate 2.5           11-30 @ 05:54          PT/INR - ( 30 Nov 2021 05:53 )   PT: 28.5 sec;   INR: 2.55 ratio         PTT - ( 30 Nov 2021 05:53 )  PTT:72.6 sec    Clean Catch Clean Catch (Midstream)   No growth -- 11-29 @ 18:28  .Body Fluid Peritoneal Fluid   No growth   polymorphonuclear leukocytes seen  No organisms seen  by cytocentrifuge 11-28 @ 19:10  .Blood Blood-Peripheral   No growth to date. -- 11-28 @ 01:00      Rapid RVP Result: NotDetec (11-27 @ 18:56)          Radiology:    Bedside ultrasound:    Tubes/lines:      GLOBAL ISSUE/BEST PRACTICE:  Analgesia:  Sedation:  HOB elevation: yes  Stress ulcer prophylaxis:  VTE prophylaxis:  Glycemic control:  Nutrition:    CODE STATUS:         Interval events: Pt seen and examined at bedside this morning. Overnight pt was desaturating on NC and had been switched to non-rebreather mask to 15L O2. She has also become increasingly agitated and needed to be restarted on precedex. Pt was in active respiratory distress and CXR was ordered. Unable to obtain ROS as patient is encephalopathic.    T(F): 97.7 (11-30-21 @ 12:30), Max: 99.5 (11-30-21 @ 08:00)  HR: 84 (11-30-21 @ 14:00) (83 - 102)  BP: 99/64 (11-30-21 @ 14:00) (89/55 - 110/60)  RR: 38 (11-30-21 @ 14:00) (15 - 42)  SpO2: 92% (11-30-21 @ 14:00) (85% - 97%)  Wt(kg): --      11-29-21 @ 07:01  -  11-30-21 @ 07:00  --------------------------------------------------------  IN: 1485.3 mL / OUT: 315 mL / NET: 1170.3 mL    11-30-21 @ 07:01  -  11-30-21 @ 14:17  --------------------------------------------------------  IN: 478 mL / OUT: 0 mL / NET: 478 mL        CAPILLARY BLOOD GLUCOSE          I&O's Summary    29 Nov 2021 07:01  -  30 Nov 2021 07:00  --------------------------------------------------------  IN: 1485.3 mL / OUT: 315 mL / NET: 1170.3 mL    30 Nov 2021 07:01  -  30 Nov 2021 14:17  --------------------------------------------------------  IN: 478 mL / OUT: 0 mL / NET: 478 mL        Physical Exam:   Gen: Lethargic, encephalopathic, and agitated. Poor clinical condition. Malnourished appearing.  Neuro: Encephalopathic. Responsive to noxious stimuli.  HEENT:NC/AT. Blood coming from nose and mouth.  Resp: Coarse rhonchi appreciated bilaterally, R>L. Decreased breath sounds at lung bases. Agonal breathing.  CVS: S1, S2. RRR  Abd: Protuberant. Soft, nontender.  Skin: Warm skin. Bleeding from peripheral IV lines, mouth, and central line.    Meds:  meropenem  IVPB   meropenem  IVPB IV Intermittent  rifAXIMin Oral    norepinephrine Infusion IV Continuous    octreotide  Injectable SubCutaneous  vasopressin Infusion IV Continuous      dexMEDEtomidine Infusion IV Continuous  melatonin Oral      heparin   Injectable SubCutaneous    lactulose Syrup Oral      albumin human 25% IVPB IV Intermittent      chlorhexidine 2% Cloths Topical                              8.0    11.95 )-----------( 140      ( 30 Nov 2021 05:53 )             22.7       11-30    135  |  107  |  90<H>  ----------------------------<  102<H>  4.6   |  13<L>  |  4.10<H>    Ca    9.1      30 Nov 2021 05:53  Phos  7.8     11-30  Mg     2.7     11-30    TPro  5.7<L>  /  Alb  3.1<L>  /  TBili  1.5<H>  /  DBili  x   /  AST  84<H>  /  ALT  34  /  AlkPhos  76  11-30    Lactate 2.5           11-30 @ 05:54          PT/INR - ( 30 Nov 2021 05:53 )   PT: 28.5 sec;   INR: 2.55 ratio         PTT - ( 30 Nov 2021 05:53 )  PTT:72.6 sec    Clean Catch Clean Catch (Midstream)   No growth -- 11-29 @ 18:28  .Body Fluid Peritoneal Fluid   No growth   polymorphonuclear leukocytes seen  No organisms seen  by cytocentrifuge 11-28 @ 19:10  .Blood Blood-Peripheral   No growth to date. -- 11-28 @ 01:00      Rapid RVP Result: NotDetec (11-27 @ 18:56)          Radiology: CXR wet read reveals bilateral interstitial infiltrates.    Bedside ultrasound: Right lung fields demonstrate predominance of B lines. L lung reveals numerous B and A lines.    Tubes/lines:  Barron  NG tube  R subclavian central line  R radial A-line  Peripheral IV lines    GLOBAL ISSUE/BEST PRACTICE:  Analgesia: Precedex  Sedation: Precedex  HOB elevation: yes  Stress ulcer prophylaxis: None  VTE prophylaxis: HSQ  Glycemic control: None  Nutrition: NPO except meds    CODE STATUS:  DNR/DNI

## 2021-11-30 NOTE — CONSULT NOTE ADULT - SUBJECTIVE AND OBJECTIVE BOX
[facility]  INFECTIOUS DISEASES   [address] [place] [address]  Tel: [phone]     Fax: [phone]  ======================================================  MD Landry Byrd Kaushal, MD Cho, Michelle, MD   ======================================================    MRN-[id number]  VALERIE NICHOLS     CC: acute renal failure/hepatic encephalopathy     HPI:  57 y/o woman with PMH of HTN, and liver cirrhosis with heavy drinking history for more than 10 years ago was admitted on 11/27 with altered mental status and dyspnea. Pt was in bed and the niece noticed she had increased work of breathing, increased abdominal distention, and seemed more altered compared few days PTA. She was tapped 2 weeks PTA and also on 11/28 with no evidence of SBP. Initially was started on Ceftriaxone but due to worsening of her condition, up trending lactic acid and mild leukocyotosis switched to meropenem and ID was called for further recommendations.    Currently she is in MICU, stuporous, on NRB with mask and 2 pressors.     PAST MEDICAL & SURGICAL HISTORY:  History of cirrhosis of liver  ETOH abuse  Thyroid goiter  S/P abdominal paracentesis    Social Hx: No current smoking, ETOH or drugs     FAMILY HISTORY:  Family history of Hodgkin&#x27;s lymphoma (Mother)    Allergies  latex (Rash)  No Known Drug Allergies      Antibiotics:  meropenem  IVPB 500 milliGRAM(s) IV Intermittent every 12 hours  rifAXIMin 550 milliGRAM(s) Oral two times a day    REVIEW OF SYSTEMS:  As per HPI, lethargic     Physical Exam:  Vital Signs Last 24 Hrs  T(C): 37.5 (30 Nov 2021 08:00), Max: 37.5 (30 Nov 2021 08:00)  T(F): 99.5 (30 Nov 2021 08:00), Max: 99.5 (30 Nov 2021 08:00)  HR: 88 (30 Nov 2021 10:00) (64 - 102)  BP: 94/54 (30 Nov 2021 08:00) (81/51 - 110/60)  BP(mean): 67 (30 Nov 2021 08:00) (61 - 79)  RR: 21 (30 Nov 2021 10:00) (15 - 60)  SpO2: 85% (30 Nov 2021 10:00) (85% - 99%)  GEN: Mild respiratory distress on NRB mask   HEENT: normocephalic and atraumatic.   NECK: Supple.  No lymphadenopathy   LUNGS: Clear to auscultation.  HEART: Regular rate and rhythm   ABDOMEN: Soft, nontender, distended with umbilical hernia  Positive bowel sounds.    : No CVA tenderness, patricia+   EXTREMITIES: Trace edema.  NEUROLOGIC: unable to perform   PSYCHIATRIC: very lethargic   SKIN: No ulceration or induration present.    Labs:  11-30    135  |  107  |  90<H>  ----------------------------<  102<H>  4.6   |  13<L>  |  4.10<H>    Ca    9.1      30 Nov 2021 05:53  Phos  7.8     11-30  Mg     2.7     11-30    TPro  5.7<L>  /  Alb  3.1<L>  /  TBili  1.5<H>  /  DBili  x   /  AST  84<H>  /  ALT  34  /  AlkPhos  76  11-30                        8.0    11.95 )-----------( 140      ( 30 Nov 2021 05:53 )             22.7     PT/INR - ( 30 Nov 2021 05:53 )   PT: 28.5 sec;   INR: 2.55 ratio    PTT - ( 30 Nov 2021 05:53 )  PTT:72.6 sec    LIVER FUNCTIONS - ( 30 Nov 2021 05:53 )  Alb: 3.1 g/dL / Pro: 5.7 g/dL / ALK PHOS: 76 U/L / ALT: 34 U/L / AST: 84 U/L / GGT: x           ABG - ( 29 Nov 2021 06:10 )  pH, Arterial: 7.22  pH, Blood: x     /  pCO2: 26    /  pO2: 114   / HCO3: 11    / Base Excess: -17.1 /  SaO2: 99.7      Procalcitonin, Serum: 0.17 ng/mL (11-29-21 @ 06:52)  Procalcitonin, Serum: 0.27 ng/mL (11-27-21 @ 20:54)    SARS-CoV-2: NotDetec (11-27-21 @ 18:56)  Rapid RVP Result: NotDetec (11-27-21 @ 18:56)    COVID-19 PCR: NotDetec (11-18-21 @ 15:08)    RECENT CULTURES:  11-28 @ 19:10 .Body Fluid Peritoneal Fluid     No growth    polymorphonuclear leukocytes seen  No organisms seen  by cytocentrifuge    11-28 @ 01:00 .Blood Blood-Peripheral     No growth to date.    11-27 @ 18:56      NotDete    All imaging and other data have been reviewed.  < from: CT Abdomen and Pelvis No Cont (11.27.21 @ 19:15) >  IMPRESSION:  Limited noncontrast study.  Liver cirrhosis. Large ascites.  Colitis versus portal colopathy as described above. Correlate with clinical symptoms.  Nonspecific geographic groundglass of the visualized lung parenchyma and dependent subsegmental atelectasis.  Pulmonary congestion versus other atypical infection can be considered. No pleural effusion of the visualized thorax.  Additional findings as above.    Assessment and Plan:   57 y/o woman with PMH of HTN, and liver cirrhosis with heavy drinking history for more than 10 years ago was admitted on 11/27 with altered mental status and dyspnea.  11/28 Blood cultlurex2 NGTD  UA with some RBC otherwise negative   Paracentesis on 11/28 transudate with low WBC/PMN  Leukocytosis 5k-->12k  Procalcitonin 0.27-->0.17   Lactate 3-->2.2  I don't see any evidence of infection at this time, I believe that her cirrhosis and liver failure causing her symptoms, mild leukocytosis could be reactional. At this point since Meropenem was started on 11/29, would continue until Cultures sent from yesterday is back.   At this point I am not suspecting MRSA or MDRO. In cirrhosis and ascites cases GNR and Enterococcus are common bacteria that are covered by zosyn or meropenem. NO fever, low procalcitonin,     1- Cirrhosis, liver failure   - Blood culture x 2 from 11/29 testing, will follow   - Even though UTI is very common in cirrhosis with ascites but with this UA not suspecting at this time   - Chest xray result from yesterday and today pending, but has bilateral opacities that are new (edema?, aspiration?), will cover for HCAP and aspiration with meropenem for now  - MRSA PCR   - Continue Meropenem   - Follow WBC, Lactate and Creat  - Will adjust ABx regimen based on cultures and her response     Thank you for courtesy of this consult.     Will follow.  Discussed with the primary team.     Gracie Virk MD  Division of Infectious Diseases   Cell [phone] between 8am and 6pm   After 6pm and weekends please call ID service at [phone].     40 minutes spent on total encounter assessing patient, examination, chart reivew, counseling and coordinating care by the attending physician/nurse/care manager.   [facility]  INFECTIOUS DISEASES   [address] [place] [address]  Tel: [phone]     Fax: [phone]  ======================================================  MD Landry Byrd Kaushal, MD Cho, Michelle, MD   ======================================================    MRN-[id number]  VALERIE NICHOLS     CC: acute renal failure/hepatic encephalopathy     HPI:  57 y/o woman with PMH of HTN, and liver cirrhosis with heavy drinking history for more than 10 years ago was admitted on 11/27 with altered mental status and dyspnea. Pt was in bed and the niece noticed she had increased work of breathing, increased abdominal distention, and seemed more altered compared few days PTA. She was tapped 2 weeks PTA and also on 11/28 with no evidence of SBP. Initially was started on Ceftriaxone but due to worsening of her condition, up trending lactic acid and mild leukocyotosis switched to meropenem and ID was called for further recommendations.    Currently she is in MICU, stuporous, on NRB with mask and 2 pressors.     PAST MEDICAL & SURGICAL HISTORY:  History of cirrhosis of liver  ETOH abuse  Thyroid goiter  S/P abdominal paracentesis    Social Hx: No current smoking, ETOH or drugs     FAMILY HISTORY:  Family history of Hodgkin&#x27;s lymphoma (Mother)    Allergies  latex (Rash)  No Known Drug Allergies      Antibiotics:  meropenem  IVPB 500 milliGRAM(s) IV Intermittent every 12 hours  rifAXIMin 550 milliGRAM(s) Oral two times a day    REVIEW OF SYSTEMS:  As per HPI, lethargic     Physical Exam:  Vital Signs Last 24 Hrs  T(C): 37.5 (30 Nov 2021 08:00), Max: 37.5 (30 Nov 2021 08:00)  T(F): 99.5 (30 Nov 2021 08:00), Max: 99.5 (30 Nov 2021 08:00)  HR: 88 (30 Nov 2021 10:00) (64 - 102)  BP: 94/54 (30 Nov 2021 08:00) (81/51 - 110/60)  BP(mean): 67 (30 Nov 2021 08:00) (61 - 79)  RR: 21 (30 Nov 2021 10:00) (15 - 60)  SpO2: 85% (30 Nov 2021 10:00) (85% - 99%)  GEN: Mild respiratory distress on NRB mask   HEENT: normocephalic and atraumatic.   NECK: Supple.  No lymphadenopathy   LUNGS: Clear to auscultation.  HEART: Regular rate and rhythm   ABDOMEN: Soft, nontender, distended with umbilical hernia  Positive bowel sounds.    : No CVA tenderness, patricia+   EXTREMITIES: Trace edema.  NEUROLOGIC: unable to perform   PSYCHIATRIC: very lethargic   SKIN: No ulceration or induration present.    Labs:  11-30    135  |  107  |  90<H>  ----------------------------<  102<H>  4.6   |  13<L>  |  4.10<H>    Ca    9.1      30 Nov 2021 05:53  Phos  7.8     11-30  Mg     2.7     11-30    TPro  5.7<L>  /  Alb  3.1<L>  /  TBili  1.5<H>  /  DBili  x   /  AST  84<H>  /  ALT  34  /  AlkPhos  76  11-30                        8.0    11.95 )-----------( 140      ( 30 Nov 2021 05:53 )             22.7     PT/INR - ( 30 Nov 2021 05:53 )   PT: 28.5 sec;   INR: 2.55 ratio    PTT - ( 30 Nov 2021 05:53 )  PTT:72.6 sec    LIVER FUNCTIONS - ( 30 Nov 2021 05:53 )  Alb: 3.1 g/dL / Pro: 5.7 g/dL / ALK PHOS: 76 U/L / ALT: 34 U/L / AST: 84 U/L / GGT: x           ABG - ( 29 Nov 2021 06:10 )  pH, Arterial: 7.22  pH, Blood: x     /  pCO2: 26    /  pO2: 114   / HCO3: 11    / Base Excess: -17.1 /  SaO2: 99.7      Procalcitonin, Serum: 0.17 ng/mL (11-29-21 @ 06:52)  Procalcitonin, Serum: 0.27 ng/mL (11-27-21 @ 20:54)    SARS-CoV-2: NotDetec (11-27-21 @ 18:56)  Rapid RVP Result: NotDetec (11-27-21 @ 18:56)    COVID-19 PCR: NotDetec (11-18-21 @ 15:08)    RECENT CULTURES:  11-28 @ 19:10 .Body Fluid Peritoneal Fluid     No growth    polymorphonuclear leukocytes seen  No organisms seen  by cytocentrifuge    11-28 @ 01:00 .Blood Blood-Peripheral     No growth to date.    11-27 @ 18:56      NotDete    All imaging and other data have been reviewed.  < from: CT Abdomen and Pelvis No Cont (11.27.21 @ 19:15) >  IMPRESSION:  Limited noncontrast study.  Liver cirrhosis. Large ascites.  Colitis versus portal colopathy as described above. Correlate with clinical symptoms.  Nonspecific geographic groundglass of the visualized lung parenchyma and dependent subsegmental atelectasis.  Pulmonary congestion versus other atypical infection can be considered. No pleural effusion of the visualized thorax.  Additional findings as above.    Assessment and Plan:   57 y/o woman with PMH of HTN, and liver cirrhosis with heavy drinking history for more than 10 years ago was admitted on 11/27 with altered mental status and dyspnea.  11/28 Blood cultlurex2 NGTD  UA with some RBC otherwise negative   Paracentesis on 11/28 transudate with low WBC/PMN  Leukocytosis 5k-->12k  Procalcitonin 0.27-->0.17   Lactate 3-->2.2  I don't see any evidence of infection at this time, I believe that her cirrhosis and liver failure causing her symptoms, mild leukocytosis could be reactional. At this point since Meropenem was started on 11/29, would continue until Cultures sent from yesterday is back.   At this point I am not suspecting MRSA or MDRO. In cirrhosis and ascites cases GNR and Enterococcus are common bacteria that are covered by zosyn or meropenem. NO fever, low procalcitonin,     Cirrhosis, liver failure, VIDHI   - Blood culture x 2 from 11/29 testing, will follow   - Even though UTI is very common in cirrhosis with ascites but with this UA not suspecting at this time   - Chest xray result from yesterday and today pending, but has bilateral opacities that are new (edema?, aspiration?), will cover for HCAP and aspiration with meropenem for now  - Had a very high troponin, could cause pulmonary edema  - MRSA PCR   - Continue Meropenem   - Follow WBC, Lactate and Creat  - Leukocytosis could be reactional   - Will adjust ABx regimen based on cultures and her response     Thank you for courtesy of this consult.     Will follow.  Discussed with the primary team.     Gracie Virk MD  Division of Infectious Diseases   Cell [phone] between 8am and 6pm   After 6pm and weekends please call ID service at [phone].     40 minutes spent on total encounter assessing patient, examination, chart reivew, counseling and coordinating care by the attending physician/nurse/care manager.

## 2021-11-30 NOTE — GOALS OF CARE CONVERSATION - ADVANCED CARE PLANNING - CONVERSATION DETAILS
Discussed case with patient's mother at bedside. Explained that patient's clinical status has deteriorated. Her vasopressor requirements are rising and her organ dysfunction is worsening. Patient's respiratory status is tenuous and oxygen requirements have increased in past 24 hours. Patient's mother expressed her wishes to keep her daughter as comfortable as possible. Would not want her intubated. Would not want CPR if her heart stops. Code status changed to DNR/DNI. Patient has three other siblings and a daughter who come in to visit. Had discussed case with patient's daughter yesterday who defers decision making to patient's mother. Will wait for family to come to bedside before any further decisions are made regarding discontinuation of vasopressors.

## 2021-11-30 NOTE — PROGRESS NOTE ADULT - SUBJECTIVE AND OBJECTIVE BOX
INTERVAL HPI/OVERNIGHT EVENTS:  events noted No new overnight event.  No N/V/D.  Tolerating diet.    Allergies    latex (Rash)  No Known Drug Allergies    Intolerances          General:  No wt loss, fevers, chills, night sweats, fatigue,   Eyes:  Good vision, no reported pain  ENT:  No sore throat, pain, runny nose, dysphagia  CV:  No pain, palpitations, hypo/hypertension  Resp:  No dyspnea, cough, tachypnea, wheezing  GI:  No pain, No nausea, No vomiting, No diarrhea, No constipation, No weight loss, No fever, No pruritis, No rectal bleeding, No tarry stools, No dysphagia,  :  No pain, bleeding, incontinence, nocturia  Muscle:  No pain, weakness  Neuro:  No weakness, tingling, memory problems  Psych:  No fatigue, insomnia, mood problems, depression  Endocrine:  No polyuria, polydipsia, cold/heat intolerance  Heme:  No petechiae, ecchymosis, easy bruisability  Skin:  No rash, tattoos, scars, edema      PHYSICAL EXAM:   Vital Signs:  Vital Signs Last 24 Hrs  T(C): 36 (30 Nov 2021 15:53), Max: 37.5 (30 Nov 2021 08:00)  T(F): 96.8 (30 Nov 2021 15:53), Max: 99.5 (30 Nov 2021 08:00)  HR: 82 (30 Nov 2021 16:00) (81 - 102)  BP: 99/58 (30 Nov 2021 16:00) (89/55 - 110/60)  BP(mean): 73 (30 Nov 2021 16:00) (65 - 79)  RR: 29 (30 Nov 2021 16:00) (15 - 43)  SpO2: 95% (30 Nov 2021 16:00) (85% - 97%)  Daily     Daily I&O's Summary    29 Nov 2021 07:01  -  30 Nov 2021 07:00  --------------------------------------------------------  IN: 1485.3 mL / OUT: 315 mL / NET: 1170.3 mL    30 Nov 2021 07:01  -  30 Nov 2021 16:36  --------------------------------------------------------  IN: 546.9 mL / OUT: 200 mL / NET: 346.9 mL        GENERAL:  Appears stated age, well-groomed, well-nourished, no distress  HEENT:  NC/AT,  conjunctivae clear and pink, no thyromegaly, nodules, adenopathy, no JVD, sclera -anicteric  CHEST:  Full & symmetric excursion, no increased effort, breath sounds clear  HEART:  Regular rhythm, S1, S2, no murmur/rub/S3/S4, no abdominal bruit, no edema  ABDOMEN:  Soft, non-tender, non-distended, normoactive bowel sounds,  no masses ,no hepato-splenomegaly, no signs of chronic liver disease  EXTEREMITIES:  no cyanosis,clubbing or edema  SKIN:  No rash/erythema/ecchymoses/petechiae/wounds/abscess/warm/dry  NEURO:  Alert, oriented, no asterixis, no tremor, no encephalopathy      LABS:                        8.0    11.95 )-----------( 140      ( 30 Nov 2021 05:53 )             22.7     11-30    135  |  107  |  90<H>  ----------------------------<  102<H>  4.6   |  13<L>  |  4.10<H>    Ca    9.1      30 Nov 2021 05:53  Phos  7.8     11-30  Mg     2.7     11-30    TPro  5.7<L>  /  Alb  3.1<L>  /  TBili  1.5<H>  /  DBili  x   /  AST  84<H>  /  ALT  34  /  AlkPhos  76  11-30    PT/INR - ( 30 Nov 2021 05:53 )   PT: 28.5 sec;   INR: 2.55 ratio         PTT - ( 30 Nov 2021 05:53 )  PTT:72.6 sec    amylase   lipase  RADIOLOGY & ADDITIONAL TESTS:

## 2021-11-30 NOTE — PROGRESS NOTE ADULT - ATTENDING COMMENTS
56 F with history of EtOH abuse c/b liver cirrhosis, hepatic encephalopathy, portal hypertension, and ascites now presents with acute decompensated cirrhosis and hepatic encephalopathy with associated shock, lactic acidosis, and acute kidney injury / hepatorenal syndrome. There was concern for abdominal compartment syndrome over the weekend s/p therapeutic paracentesis 11/28 with drainage of 3.4 liters. Patient remains in multisystem organ failure with worsening shock state and now with acute hypoxemic respiratory failure. Respiratory status is tenuous. Discussed case with patient's mother at bedside. Decision made to change code status to DNR/DNI. Patient's mother will consider comfort measures but waiting for other family members to come to bedside before decision is made. Discussed case with [facility] transplant team yesterday. Overall prognosis is poor. Will continue empiric antibiotics, lactulose, rifaximin, octreotide in the meantime. Continue vasopressor support with target MAP > 65, Precedex gtt for agitation, supportive care.

## 2021-11-30 NOTE — PROGRESS NOTE ADULT - REASON FOR ADMISSION
acute renal failure/hepatic encephalopathy

## 2021-12-01 LAB — ANA TITR SER: NEGATIVE — SIGNIFICANT CHANGE UP

## 2021-12-03 LAB
CULTURE RESULTS: SIGNIFICANT CHANGE UP
SPECIMEN SOURCE: SIGNIFICANT CHANGE UP

## 2021-12-05 LAB
CULTURE RESULTS: SIGNIFICANT CHANGE UP
CULTURE RESULTS: SIGNIFICANT CHANGE UP
SPECIMEN SOURCE: SIGNIFICANT CHANGE UP
SPECIMEN SOURCE: SIGNIFICANT CHANGE UP

## 2021-12-29 LAB
CULTURE RESULTS: SIGNIFICANT CHANGE UP
SPECIMEN SOURCE: SIGNIFICANT CHANGE UP

## 2022-01-19 LAB
CULTURE RESULTS: SIGNIFICANT CHANGE UP
SPECIMEN SOURCE: SIGNIFICANT CHANGE UP

## 2023-03-30 NOTE — PATIENT PROFILE ADULT - LIVING ENVIRONMENT

## 2023-05-05 NOTE — DISCHARGE NOTE FOR THE EXPIRED PATIENT - NS PRELIMINARY CAUSE OF DEATH_RESTRICTED
Detail Level: Detailed Detail Level: Generalized Detail Level: Simple Patient Specific Counseling (Will Not Stick From Patient To Patient): Pt. Will use Efudex scalp in fall Multi-organ Dysfunction Syndrome/Renal Failure/Other:

## 2024-10-15 NOTE — PROGRESS NOTE ADULT - PROBLEM SELECTOR PROBLEM 7
[Time Spent: ___ minutes] : I have spent [unfilled] minutes of time on the encounter which excludes teaching and separately reported services.
HTN (hypertension)

## 2024-11-29 NOTE — PROGRESS NOTE ADULT - PROBLEM SELECTOR PLAN 8
SubQ Heparin and SCDs    prognosis guarded    All care per MICU
SubQ Heparin and SCDs
SubQ Heparin and SCDs    prognosis guarded    All care per MICU
29-Nov-2024 01:30